# Patient Record
Sex: MALE | Race: WHITE | NOT HISPANIC OR LATINO | URBAN - METROPOLITAN AREA
[De-identification: names, ages, dates, MRNs, and addresses within clinical notes are randomized per-mention and may not be internally consistent; named-entity substitution may affect disease eponyms.]

---

## 2020-07-14 ENCOUNTER — INPATIENT (INPATIENT)
Facility: HOSPITAL | Age: 45
LOS: 1 days | Discharge: HOME | DRG: 603 | End: 2020-07-16
Attending: INTERNAL MEDICINE | Admitting: INTERNAL MEDICINE
Payer: MEDICAID

## 2020-07-14 VITALS
RESPIRATION RATE: 18 BRPM | OXYGEN SATURATION: 100 % | TEMPERATURE: 98 F | WEIGHT: 169.98 LBS | HEIGHT: 72 IN | HEART RATE: 104 BPM | DIASTOLIC BLOOD PRESSURE: 73 MMHG | SYSTOLIC BLOOD PRESSURE: 124 MMHG

## 2020-07-14 LAB
ALBUMIN SERPL ELPH-MCNC: 3.6 G/DL — SIGNIFICANT CHANGE UP (ref 3.3–5)
ALP SERPL-CCNC: 45 U/L — SIGNIFICANT CHANGE UP (ref 40–120)
ALT FLD-CCNC: 19 U/L — SIGNIFICANT CHANGE UP (ref 10–45)
ANION GAP SERPL CALC-SCNC: 12 MMOL/L — SIGNIFICANT CHANGE UP (ref 5–17)
AST SERPL-CCNC: 16 U/L — SIGNIFICANT CHANGE UP (ref 10–40)
BASOPHILS # BLD AUTO: 0.03 K/UL — SIGNIFICANT CHANGE UP (ref 0–0.2)
BASOPHILS NFR BLD AUTO: 0.4 % — SIGNIFICANT CHANGE UP (ref 0–2)
BILIRUB SERPL-MCNC: 0.6 MG/DL — SIGNIFICANT CHANGE UP (ref 0.2–1.2)
BUN SERPL-MCNC: 16 MG/DL — SIGNIFICANT CHANGE UP (ref 7–23)
CALCIUM SERPL-MCNC: 9 MG/DL — SIGNIFICANT CHANGE UP (ref 8.4–10.5)
CHLORIDE SERPL-SCNC: 102 MMOL/L — SIGNIFICANT CHANGE UP (ref 96–108)
CO2 SERPL-SCNC: 27 MMOL/L — SIGNIFICANT CHANGE UP (ref 22–31)
CREAT SERPL-MCNC: 1.1 MG/DL — SIGNIFICANT CHANGE UP (ref 0.5–1.3)
EOSINOPHIL # BLD AUTO: 0.17 K/UL — SIGNIFICANT CHANGE UP (ref 0–0.5)
EOSINOPHIL NFR BLD AUTO: 2 % — SIGNIFICANT CHANGE UP (ref 0–6)
GLUCOSE SERPL-MCNC: 73 MG/DL — SIGNIFICANT CHANGE UP (ref 70–99)
HCT VFR BLD CALC: 40.9 % — SIGNIFICANT CHANGE UP (ref 39–50)
HGB BLD-MCNC: 13.3 G/DL — SIGNIFICANT CHANGE UP (ref 13–17)
IMM GRANULOCYTES NFR BLD AUTO: 0.5 % — SIGNIFICANT CHANGE UP (ref 0–1.5)
LYMPHOCYTES # BLD AUTO: 1.28 K/UL — SIGNIFICANT CHANGE UP (ref 1–3.3)
LYMPHOCYTES # BLD AUTO: 15.1 % — SIGNIFICANT CHANGE UP (ref 13–44)
MCHC RBC-ENTMCNC: 29.6 PG — SIGNIFICANT CHANGE UP (ref 27–34)
MCHC RBC-ENTMCNC: 32.5 GM/DL — SIGNIFICANT CHANGE UP (ref 32–36)
MCV RBC AUTO: 91.1 FL — SIGNIFICANT CHANGE UP (ref 80–100)
MONOCYTES # BLD AUTO: 0.85 K/UL — SIGNIFICANT CHANGE UP (ref 0–0.9)
MONOCYTES NFR BLD AUTO: 10 % — SIGNIFICANT CHANGE UP (ref 2–14)
NEUTROPHILS # BLD AUTO: 6.11 K/UL — SIGNIFICANT CHANGE UP (ref 1.8–7.4)
NEUTROPHILS NFR BLD AUTO: 72 % — SIGNIFICANT CHANGE UP (ref 43–77)
NRBC # BLD: 0 /100 WBCS — SIGNIFICANT CHANGE UP (ref 0–0)
PLATELET # BLD AUTO: 296 K/UL — SIGNIFICANT CHANGE UP (ref 150–400)
POTASSIUM SERPL-MCNC: 4 MMOL/L — SIGNIFICANT CHANGE UP (ref 3.5–5.3)
POTASSIUM SERPL-SCNC: 4 MMOL/L — SIGNIFICANT CHANGE UP (ref 3.5–5.3)
PROT SERPL-MCNC: 8.2 G/DL — SIGNIFICANT CHANGE UP (ref 6–8.3)
RBC # BLD: 4.49 M/UL — SIGNIFICANT CHANGE UP (ref 4.2–5.8)
RBC # FLD: 12.8 % — SIGNIFICANT CHANGE UP (ref 10.3–14.5)
SARS-COV-2 RNA SPEC QL NAA+PROBE: SIGNIFICANT CHANGE UP
SODIUM SERPL-SCNC: 141 MMOL/L — SIGNIFICANT CHANGE UP (ref 135–145)
WBC # BLD: 8.48 K/UL — SIGNIFICANT CHANGE UP (ref 3.8–10.5)
WBC # FLD AUTO: 8.48 K/UL — SIGNIFICANT CHANGE UP (ref 3.8–10.5)

## 2020-07-14 PROCEDURE — 73701 CT LOWER EXTREMITY W/DYE: CPT | Mod: 26,RT

## 2020-07-14 PROCEDURE — 99223 1ST HOSP IP/OBS HIGH 75: CPT

## 2020-07-14 PROCEDURE — 99285 EMERGENCY DEPT VISIT HI MDM: CPT

## 2020-07-14 RX ORDER — SODIUM CHLORIDE 9 MG/ML
1000 INJECTION INTRAMUSCULAR; INTRAVENOUS; SUBCUTANEOUS
Refills: 0 | Status: DISCONTINUED | OUTPATIENT
Start: 2020-07-14 | End: 2020-07-15

## 2020-07-14 RX ORDER — ACETAMINOPHEN 500 MG
650 TABLET ORAL ONCE
Refills: 0 | Status: COMPLETED | OUTPATIENT
Start: 2020-07-14 | End: 2020-07-14

## 2020-07-14 RX ORDER — PIPERACILLIN AND TAZOBACTAM 4; .5 G/20ML; G/20ML
3.38 INJECTION, POWDER, LYOPHILIZED, FOR SOLUTION INTRAVENOUS ONCE
Refills: 0 | Status: COMPLETED | OUTPATIENT
Start: 2020-07-14 | End: 2020-07-14

## 2020-07-14 RX ORDER — VANCOMYCIN HCL 1 G
1250 VIAL (EA) INTRAVENOUS ONCE
Refills: 0 | Status: COMPLETED | OUTPATIENT
Start: 2020-07-14 | End: 2020-07-14

## 2020-07-14 RX ADMIN — Medication 250 MILLIGRAM(S): at 19:38

## 2020-07-14 RX ADMIN — Medication 650 MILLIGRAM(S): at 18:54

## 2020-07-14 RX ADMIN — SODIUM CHLORIDE 1000 MILLILITER(S): 9 INJECTION INTRAMUSCULAR; INTRAVENOUS; SUBCUTANEOUS at 18:55

## 2020-07-14 RX ADMIN — SODIUM CHLORIDE 1000 MILLILITER(S): 9 INJECTION INTRAMUSCULAR; INTRAVENOUS; SUBCUTANEOUS at 21:02

## 2020-07-14 RX ADMIN — Medication 1250 MILLIGRAM(S): at 21:02

## 2020-07-14 RX ADMIN — PIPERACILLIN AND TAZOBACTAM 200 GRAM(S): 4; .5 INJECTION, POWDER, LYOPHILIZED, FOR SOLUTION INTRAVENOUS at 18:55

## 2020-07-14 NOTE — H&P ADULT - PROBLEM SELECTOR PLAN 1
Pt presenting with worsening RLE swelling and redness s/p I&D and oral clindamycin. Found to have cellulitis of the RLE. No R knee involvement. Hx of MRSA skin infections in 2006 and 2009. S/p Vanc/zosyn in ED  - C/w Vanc/Zosyn  - Surgery consulted Pt presenting with worsening RLE swelling and redness s/p I&D and oral clindamycin. Not septic. Found to have cellulitis of the RLE. No R knee involvement. Hx of MRSA skin infections in 2006 and 2009. Low suspicion for nec fasc due to lack of severe pain and no crepitus. Low suspicion for compartment syndrome due to lack of sensation change and pain. S/p Vanc/zosyn in ED  - C/w Vanc/Zosyn, de-escalate if blood cultures are negative   - Surgery consulted, no surgical recommendation  - If knee becomes involved consult ortho Pt presenting with worsening RLE swelling and redness s/p I&D and oral clindamycin. Not septic. Found to have cellulitis of the RLE. No R knee involvement. Hx of MRSA skin infections in 2006 and 2009. Low suspicion for nec fasc due to lack of severe pain and no crepitus. Low suspicion for compartment syndrome due to lack of sensation change and pain. S/p Vanc/zosyn in ED  - C/w Vanc/Zosyn due to exposures (Beach and hot tub) increases chances for pseudomonas, de-escalate if blood cultures are negative   - Surgery consulted, no surgical recommendation  - If knee becomes involved consult ortho

## 2020-07-14 NOTE — H&P ADULT - PROBLEM SELECTOR PLAN 2
Pt has not been on anti-retro viral medications since 2017 after having an adverse reaction to truvada. Is open to speaking with HIV team and establishing new follow-up.  - Consult HIV team  - F/u CD4 and viral load labs

## 2020-07-14 NOTE — ED PROVIDER NOTE - PROGRESS NOTE DETAILS
Klepfish: labs grossly wnl. Pt remains w/o systemic symptoms and exam unchanged. clinically not nec fasc. evaluated by surgery, awaiting dispo. updated pt.

## 2020-07-14 NOTE — ED PROVIDER NOTE - CADM POA PRESS ULCER
After consent obtained/verified, allergy injection given in back of R/L arm(s). Documentation of vial injection specific to arm(s) noted on Allergy Immunotherapy Administration Form. Patient waited 30 minutes for observation. Patient tolerated well without adverse reaction. SHOT REACTION TREATMENT INSTRUCTIONS    During the 30 minute wait after an allergy injection the following symptoms should be reported:    Itching other than at the injection site  Hives or swelling other than at the injection site  Redness other than at the injection site  Difficulty breathing  Chest tightness  Difficulty swallowing  Throat tightness    If these symptoms occur, NOTIFY PROVIDER and the following treatment should be administered:    1. Epinephrine 1:1000 IM - 0.3 ml if > 66 lbs or more, 0.15 ml if 33 - 63 lbs, or 0.1 ml if <33 lbs   2. Diphenhydramine - give all intramuscular:     2 to <6 years (off-label use): 6.25 mg,    6 to <12 years: 12.5 to 25 mg;    ?12 years: 25-50 mg.  3.  Famotidine:  Adults 40 mg oral    Adolescents age 12 years and >88 lbs: 40 mg    Children and Adolescents ? 12years of age: Initial: 0.25 mg/kg/dose   every 12 hours (maximum daily dose: 40 mg/day)    Epi dose may me repeated in 5-15 minutes if adequate resolution of symptoms does not occur    Patient should be observed for at least one hour after final epi dose and must be seen by provider. Patients cannot drive themselves if they have received diphenhydramine. No

## 2020-07-14 NOTE — CONSULT NOTE ADULT - SUBJECTIVE AND OBJECTIVE BOX
CONSULT NOTE    HPI:  44 y/o Male pmh [facility] with unknown viral load and CD4 count presents with 3 days of RLE pain and swelling. Patient states 3 days ago he noted swelling and redness around the ankle. Swelling associated with pain. He was seen at an urgent care where an I&D of the lession was performed and was discharge on Clindamycin He now returns with increased swelling and drainage from the area, as well as progression up the leg. States has had similar infections in the past, most recent was an MRSA infection in 2009 of the Left Lower extremity. Denies numbness, tingling, chills, or additional acute complaints.         PAST MEDICAL & SURGICAL HISTORY:  HIV (human immunodeficiency virus infection)        PAST SURGICAL HISTORY:   No significant Past Surgical History     REVIEW OF SYSTEMS:   Pertinent positives/negatives noted in HPI.     HOME MEDICATIONS:  Valacyclovir    ALLERGIES:  Allergies    No Known Allergies    Intolerances        SOCIAL HISTORY:    FAMILY HISTORY:      Vital Signs Last 24 Hrs  T(C): 37.1 (14 Jul 2020 22:36), Max: 37.1 (14 Jul 2020 22:36)  T(F): 98.7 (14 Jul 2020 22:36), Max: 98.7 (14 Jul 2020 22:36)  HR: 90 (14 Jul 2020 22:36) (90 - 104)  BP: 110/69 (14 Jul 2020 22:36) (110/69 - 124/73)  BP(mean): --  RR: 16 (14 Jul 2020 22:36) (16 - 18)  SpO2: 100% (14 Jul 2020 22:36) (100% - 100%)    Physical Exam  General: NAD, resting comfortably in bed  C/V: NSR  Pulm: Nonlabored breathing, no respiratory distress  Abd: soft, non-tender, non-distended.  Extrem: WWP, RLE indurated and erythematous from the ankle to the knee, area of drainage at the lateral leg.   Neuro: A/O x 3, CNs II-XII grossly intact, no focal deficits, normal sensation of bilateral LE  Pulses: palpable distal pulses     LABS:                        13.3   8.48  )-----------( 296      ( 14 Jul 2020 18:09 )             40.9     07-14    141  |  102  |  16  ----------------------------<  73  4.0   |  27  |  1.10    Ca    9.0      14 Jul 2020 18:09    TPro  8.2  /  Alb  3.6  /  TBili  0.6  /  DBili  x   /  AST  16  /  ALT  19  /  AlkPhos  45  07-14          RADIOLOGY AND ADDITIONAL STUDIES  < from: CT Lower Extremity w/ IV Cont, Right (07.14.20 @ 18:40) >  FINDINGS:  Bones/joints: Normal. No acute fracture or dislocation.  Soft tissues: Edematous change subcutaneous soft tissues at the calf and top of the foot consistent  with history of cellulitis. No definite involvement of the deep soft tissues. No focal fluid collections to  suggest abscess formation.    IMPRESSION:  Edematous change subcutaneous soft tissues at the calf and top of the foot consistent with history of  cellulitis. No definite involvement of the deep soft tissues.  Thank you for allowing us to participate in the care of your patient.  Dictated and Authenticated by: Logan Bradford MD  07/14/2020 8:13 PM Eastern Time ([place] & [place])      The above report was submitted by the [facility] attending radiologist and copied to PowerScribe by resident Dr. Scales.             Thank you for the opportunity to participate in the care of this patient.  ******PRELIMINARY REPORT******    ******PRELIMINARY REPORT******          ION SCALES M.D., RADIOLOGY RESIDENT        < end of copied text >

## 2020-07-14 NOTE — ED PROVIDER NOTE - PHYSICAL EXAMINATION
Klepfish: normal dorsi/plantar flexion, sensation intact, strength 5/5, normal equal distal pulses. +wound to RLE, lateral aspect, superior to malleolus, +minimal localized firmness at that site. 2+ edema from toes to distal thigh, +blanching erythema/warmth as well. No crepitus, induration, fluctuance. FROM all extremities. Klepfish: normal dorsi/plantar flexion, sensation intact, strength 5/5, normal equal distal pulses. +wound to RLE, lateral aspect, superior to malleolus, +minimal localized firmness at that site. 2+ edema from toes to distal thigh, +blanching erythema/warmth as well. No crepitus, induration, fluctuance. FROM all extremities.    VITAL SIGNS: I have reviewed nursing notes and confirm.  CONSTITUTIONAL: Well-developed; well-nourished; in no acute distress.   SKIN:  warm and dry, no acute rash.   HEAD:  normocephalic, atraumatic.  EYES: PERRL, EOM intact; conjunctiva and sclera clear.  ENT: No nasal discharge; airway clear.   NECK: Supple; non tender.  CARD: S1, S2 normal; no murmurs, gallops, or rubs. Regular rate and rhythm.   RESP:  Clear to auscultation b/l, no wheezes, rales or rhonchi.  ABD: Normal bowel sounds; soft; non-distended; non-tender; no guarding/ rebound.  NEURO: Alert, oriented, grossly unremarkable  PSYCH: Cooperative, mood and affect appropriate.

## 2020-07-14 NOTE — H&P ADULT - NSHPPHYSICALEXAM_GEN_ALL_CORE
.  VITAL SIGNS:  T(C): 37.1 (07-14-20 @ 22:36), Max: 37.1 (07-14-20 @ 22:36)  T(F): 98.7 (07-14-20 @ 22:36), Max: 98.7 (07-14-20 @ 22:36)  HR: 90 (07-14-20 @ 22:36) (90 - 104)  BP: 110/69 (07-14-20 @ 22:36) (110/69 - 124/73)  BP(mean): --  RR: 16 (07-14-20 @ 22:36) (16 - 18)  SpO2: 100% (07-14-20 @ 22:36) (100% - 100%)  Wt(kg): --    PHYSICAL EXAM:    Constitutional: WDWN resting comfortably in chair; NAD  Eyes: PERRL, anicteric sclera  ENT: Couple of herpes lesions on lips   Neck: supple; no JVD  Respiratory: CTA B/L; no W/R/R, no retractions  Cardiac: +S1/S2; RRR; no M/R/G; PMI non-displaced  Gastrointestinal: abdomen soft, NT/ND; no rebound or guarding; +BSx4  Extremities: WWP. 2+ edema in the R foot, R LE is swollen and tense from the ankle to just below the knee. Erythematous and warm from below knee to and including the foot.   Musculoskeletal: NROM x4; no joint swelling, tenderness or erythema  Vascular: 2+ radial, DP/PT pulses B/L  Neurologic: AAOx3 .  VITAL SIGNS:  T(C): 37.1 (07-14-20 @ 22:36), Max: 37.1 (07-14-20 @ 22:36)  T(F): 98.7 (07-14-20 @ 22:36), Max: 98.7 (07-14-20 @ 22:36)  HR: 90 (07-14-20 @ 22:36) (90 - 104)  BP: 110/69 (07-14-20 @ 22:36) (110/69 - 124/73)  BP(mean): --  RR: 16 (07-14-20 @ 22:36) (16 - 18)  SpO2: 100% (07-14-20 @ 22:36) (100% - 100%)  Wt(kg): --    PHYSICAL EXAM:    Constitutional: WDWN resting comfortably in chair; NAD  Eyes: PERRL, anicteric sclera  ENT: Couple of herpes lesions on lips   Neck: supple; no JVD  Respiratory: CTA B/L; no W/R/R, no retractions  Cardiac: +S1/S2; RRR; no M/R/G; PMI non-displaced  Gastrointestinal: abdomen soft, NT/ND; no rebound or guarding; +BSx4  Extremities: WWP. 2+ edema in the R foot, R LE is swollen and tense from the ankle to just below the knee. Erythematous and warm from below knee to and including the foot. No crepitus, no TTP.  Musculoskeletal: NROM x4; no joint swelling, tenderness or erythema  Vascular: 2+ radial, DP/PT pulses B/L  Neurologic: AAOx3

## 2020-07-14 NOTE — ED PROVIDER NOTE - NS ED ROS FT
Constitutional: No fever. No chills.  Eyes: No redness. No discharge. No vision change.   ENT: No sore throat. No ear pain.  Cardiovascular: No chest pain. No leg swelling.  Respiratory: No cough. No shortness of breath.  GI: No abdominal pain. No vomiting. No diarrhea.   MSK: Right lower leg redness and swelling. No back pain.   Skin: No rash. No abrasions.   Neuro: No numbness. No weakness.   Psych: No known mental health issues.

## 2020-07-14 NOTE — H&P ADULT - ATTENDING COMMENTS
44 y/o MSM Male PMHx HIV (Unknown VL, CD4 - not on medication) p/w progressive RLE pain and swelling after recent I&D of ankle abscess – admitted for worsening cellulitis.     1. Severe Cellulitis, Purulent   - Not meeting SIRS at this time   - Patient with focal ankle abscess, s/p I&D, with expression of significant pus. Now, area with minimal drainage.   - Potential for seeding of bacteria given recent instrumentation leading to progression of cellulitis   - Patient w/ h/o MRSA infections and abscess formation   - Also with beach/hot tub exposures - increasing risk for pseudomonas or polymicrobial infection   - At present, c/w Vanc/Zosyn   - Obtain [facility] MD Culture Data   - F/u Blood Cultures   - Surgery consulted - CT reveals a superficial infection, no gas - no surgical intervention required.     2. HIV, untreated   - HIV Consult   - F/u CD4/VL  - Lack of treatment and f/u due to recent lapse in insurance   - Prior h/o reaction to Truvada

## 2020-07-14 NOTE — CONSULT NOTE ADULT - ASSESSMENT
46 y/o Male pmh HIV with unknown viral load and CD4 count presents with 3 days of RLE pain and swelling 2/2 Cellulitis. Surgery consulted for concern for necrotizing fasciitis. Given CT and physical exam findings, less likely Necrotizing Fasciitis.    -No acute surgical intervention at this time  -cont w/ IV antibiotics  -Recommend admitting to medicine  -Recommend LE Dupplex to r/o DVT  -Team 4c will continue to follow  -Plan discussed with attending and chief resident

## 2020-07-14 NOTE — H&P ADULT - HISTORY OF PRESENT ILLNESS
45M w/ pmhx of HIV (not currently on anti-retroviral therapy and unk last CD4/viral load count) and previous MRSA skin infections in 2006 and 2009, presenting with increased swelling, erythema and subjective fevers that have progressively worsened since 7/12. Starting 7/8 the patient noticed a circular raised red rash on the posterior R leg. Between 7/8 and 7/12 this grew in size after attempting to bring it down with warm compresses. The patient endorsed hot tub and public pool usage in [place] on 7/9. On 7/12 the patient went to an urgent care where a I&D was performed and was sent home with clindamycin. The redness and swelling since th I&D spread down to and including the foot as well as up to the knee, but not including the knee. The patient endorses minimal pain and discomfort, as well as subjective fevers. Pt denies any joint pain, weakness, change in sensation, HA, or lightheadedness.     ED Vitals: Afebrile, -->90, /73-->110/69, RR 18, O2% 100 RA  Labs s/f: None  ED Course: Vanc/zosyn, tylenol, 1L NS. Surg consulted with no recommendations   Imaging: CT LE showing cellulitis with no abscess, gas or involvement in deeper tissues 45M w/ pmhx of HIV (not currently on anti-retroviral therapy and unk last CD4/viral load count) and previous MRSA skin infections in 2006 and 2009, presenting with increased swelling, erythema and subjective fevers that have progressively worsened since 7/12. Starting 7/8 the patient noticed a circular raised red rash on the posterior R leg. Between 7/8 and 7/12 this grew in size after attempting to bring it down with warm compresses. The patient endorsed hot tub and public pool usage in [place] on 7/9 as well as beach during the week prior to noticing the initial lesion. On 7/12 the patient went to an urgent care where a I&D was performed and was sent home with clindamycin. The redness and swelling since th I&D spread down to and including the foot as well as up to the knee, but not including the knee. The patient endorses minimal pain and discomfort, as well as subjective fevers. Pt denies any joint pain, weakness, change in sensation, HA, or lightheadedness.     ED Vitals: Afebrile, -->90, /73-->110/69, RR 18, O2% 100 RA  Labs s/f: None  ED Course: Vanc/zosyn, tylenol, 1L NS. Surg consulted with no recommendations   Imaging: CT LE showing cellulitis with no abscess, gas or involvement in deeper tissues

## 2020-07-14 NOTE — ED PROVIDER NOTE - OBJECTIVE STATEMENT
46 y/o M, PMHx of HIV not on antiretroviral viral load, unknown CD4, presents to the ED complaining of 3 days of progressive worsening right lower leg redness and swelling. Was seen 3 days ago at a urgent care for abscess on right ankle. Had I&D at that time and place. On clindamycin which he is taking. States over 24 hours, noted significant increase in lower leg swelling and redness creeping up the leg and pain at the site. Also reports a fever with max temp of 100. Today was reevaluated at urgent care and sent to ED for IV antibiotics. 44 y/o M, PMHx of HIV not on antiretroviral viral load, unknown CD4, presents to the ED complaining of 3 days of progressive worsening right lower leg redness and swelling. Was seen 3 days ago at a urgent care for abscess on right ankle. Had I&D at that time and place. On clindamycin which he is taking. States over 24 hours, noted significant increase in lower leg swelling and redness creeping up the leg and pain at the site. Also reports a fever with max temp of 100F. Today was reevaluated at urgent care and sent to ED for IV antibiotics.

## 2020-07-14 NOTE — H&P ADULT - NSHPSOCIALHISTORY_GEN_ALL_CORE
Social EtOH use. Occasional cocaine, marijuana, crystal usage. 1 pack year history. No new recent sexual partners

## 2020-07-14 NOTE — ED PROVIDER NOTE - CLINICAL SUMMARY MEDICAL DECISION MAKING FREE TEXT BOX
ED course unremarkable - afebrile and hemodynamically stable. Pt has extensive right lower leg cellulitis. WBC wnl. No acute metabolic abnormalty. Lactate pending. Blood cultures x2 pending. Started on vancomycin IV and zosyn IV while in ED. CT right lower extremity obtained, notable for extensive cellulitis without gas or deep space abscess. Surgery consulted for evaluation. Pending admission.

## 2020-07-14 NOTE — CONSULT NOTE ADULT - ATTENDING COMMENTS
Patient seen and examined at bedside on 7/15.  RLE with erythema and edema.    CT consistent with cellulitis.    Continue IV antibiotics  Leg elevation  LE duplex to rule out DVT

## 2020-07-14 NOTE — H&P ADULT - NSHPLABSRESULTS_GEN_ALL_CORE
.  LABS:                         13.3   8.48  )-----------( 296      ( 14 Jul 2020 18:09 )             40.9     07-14    141  |  102  |  16  ----------------------------<  73  4.0   |  27  |  1.10    Ca    9.0      14 Jul 2020 18:09    TPro  8.2  /  Alb  3.6  /  TBili  0.6  /  DBili  x   /  AST  16  /  ALT  19  /  AlkPhos  45  07-14              RADIOLOGY, EKG & ADDITIONAL TESTS: Reviewed.

## 2020-07-14 NOTE — H&P ADULT - ASSESSMENT
45M w/ hx of HIV (not on medication, unk last viral/CD4 count) and previous MRSA skin infections p/w worsening swelling, redness in the R LE and subjective fevers s/p I&D on 7/12 and oral clindamycin found to have cellulitis

## 2020-07-14 NOTE — H&P ADULT - PROBLEM SELECTOR PLAN 3
F: tolerating PO, no IVF  E: replete K<4, Mg<2  N: Regular     VTE Prophylaxis: None   C: Full Code  D: RMF

## 2020-07-14 NOTE — ED PROVIDER NOTE - ATTENDING CONTRIBUTION TO CARE
45M PMh HIV (not on HAART for yrs, unknown last CDV/VL) p/w RLE pain. A few days ago pt developed RLE pain/swelling, went to  2d ago where I and D of RLE perofrmed and started on clinda, pt adherent. Since yesterday, pain has worsened and RLE w/ dramatic increase in redness and swelling, also subjective fevers yesterday. Hx of prior MRSA abscess. Denies HA, SOB/CP, lightheaded, NVD, abd pain, focal weakness/numbness. Mild tachycardia, other vitals wnl, exam as above.  ddx: Extensive cellultis, ?deeper infection/abscess, low suspicion for nec fasc. Clinically not compartment syndrome.  Given extensive swelling and rapid onset, pt sent for stat CT to eval deeper infection (d/w pt risks/benefits of CT w/ contrast prior to cr results).   I d/w radiologist - has extensive inflammation but no obvious abscess/foci of gas. Given extent of infection, surgery consulted as pt may benefit from being on surgical service or possible future debridement should infection worsen.  blood cx, CK, CBC, cmp, empiric abx. Reassess.   Pt aware of plan.

## 2020-07-15 ENCOUNTER — TRANSCRIPTION ENCOUNTER (OUTPATIENT)
Age: 45
End: 2020-07-15

## 2020-07-15 DIAGNOSIS — B20 HUMAN IMMUNODEFICIENCY VIRUS [HIV] DISEASE: ICD-10-CM

## 2020-07-15 DIAGNOSIS — Z29.9 ENCOUNTER FOR PROPHYLACTIC MEASURES, UNSPECIFIED: ICD-10-CM

## 2020-07-15 DIAGNOSIS — L03.90 CELLULITIS, UNSPECIFIED: ICD-10-CM

## 2020-07-15 LAB
4/8 RATIO: 0.13 RATIO — LOW (ref 0.9–3.6)
ABS CD8: 787 /UL — HIGH (ref 142–740)
ALBUMIN SERPL ELPH-MCNC: 3.4 G/DL — SIGNIFICANT CHANGE UP (ref 3.3–5)
ALP SERPL-CCNC: 54 U/L — SIGNIFICANT CHANGE UP (ref 40–120)
ALT FLD-CCNC: 17 U/L — SIGNIFICANT CHANGE UP (ref 10–45)
ANION GAP SERPL CALC-SCNC: 10 MMOL/L — SIGNIFICANT CHANGE UP (ref 5–17)
AST SERPL-CCNC: 15 U/L — SIGNIFICANT CHANGE UP (ref 10–40)
BASOPHILS # BLD AUTO: 0.05 K/UL — SIGNIFICANT CHANGE UP (ref 0–0.2)
BASOPHILS NFR BLD AUTO: 0.6 % — SIGNIFICANT CHANGE UP (ref 0–2)
BILIRUB SERPL-MCNC: 0.3 MG/DL — SIGNIFICANT CHANGE UP (ref 0.2–1.2)
BUN SERPL-MCNC: 16 MG/DL — SIGNIFICANT CHANGE UP (ref 7–23)
CALCIUM SERPL-MCNC: 8.3 MG/DL — LOW (ref 8.4–10.5)
CD3 BLASTS SPEC-ACNC: 82 % — SIGNIFICANT CHANGE UP (ref 59–83)
CD3 BLASTS SPEC-ACNC: 926 /UL — SIGNIFICANT CHANGE UP (ref 672–1870)
CD4 %: 9 % — LOW (ref 30–62)
CD8 %: 70 % — HIGH (ref 12–36)
CHLORIDE SERPL-SCNC: 103 MMOL/L — SIGNIFICANT CHANGE UP (ref 96–108)
CO2 SERPL-SCNC: 23 MMOL/L — SIGNIFICANT CHANGE UP (ref 22–31)
CREAT SERPL-MCNC: 0.97 MG/DL — SIGNIFICANT CHANGE UP (ref 0.5–1.3)
EOSINOPHIL # BLD AUTO: 0.29 K/UL — SIGNIFICANT CHANGE UP (ref 0–0.5)
EOSINOPHIL NFR BLD AUTO: 3.2 % — SIGNIFICANT CHANGE UP (ref 0–6)
GLUCOSE SERPL-MCNC: 103 MG/DL — HIGH (ref 70–99)
HCT VFR BLD CALC: 38.8 % — LOW (ref 39–50)
HGB BLD-MCNC: 12.7 G/DL — LOW (ref 13–17)
HIV 1+2 AB+HIV1 P24 AG SERPL QL IA: REACTIVE
IMM GRANULOCYTES NFR BLD AUTO: 0.4 % — SIGNIFICANT CHANGE UP (ref 0–1.5)
LYMPHOCYTES # BLD AUTO: 1.1 K/UL — SIGNIFICANT CHANGE UP (ref 1–3.3)
LYMPHOCYTES # BLD AUTO: 12.2 % — LOW (ref 13–44)
MAGNESIUM SERPL-MCNC: 2.1 MG/DL — SIGNIFICANT CHANGE UP (ref 1.6–2.6)
MCHC RBC-ENTMCNC: 29.9 PG — SIGNIFICANT CHANGE UP (ref 27–34)
MCHC RBC-ENTMCNC: 32.7 GM/DL — SIGNIFICANT CHANGE UP (ref 32–36)
MCV RBC AUTO: 91.3 FL — SIGNIFICANT CHANGE UP (ref 80–100)
MONOCYTES # BLD AUTO: 0.63 K/UL — SIGNIFICANT CHANGE UP (ref 0–0.9)
MONOCYTES NFR BLD AUTO: 7 % — SIGNIFICANT CHANGE UP (ref 2–14)
NEUTROPHILS # BLD AUTO: 6.87 K/UL — SIGNIFICANT CHANGE UP (ref 1.8–7.4)
NEUTROPHILS NFR BLD AUTO: 76.6 % — SIGNIFICANT CHANGE UP (ref 43–77)
NRBC # BLD: 0 /100 WBCS — SIGNIFICANT CHANGE UP (ref 0–0)
PLATELET # BLD AUTO: 288 K/UL — SIGNIFICANT CHANGE UP (ref 150–400)
POTASSIUM SERPL-MCNC: 4.3 MMOL/L — SIGNIFICANT CHANGE UP (ref 3.5–5.3)
POTASSIUM SERPL-SCNC: 4.3 MMOL/L — SIGNIFICANT CHANGE UP (ref 3.5–5.3)
PROT SERPL-MCNC: 7.6 G/DL — SIGNIFICANT CHANGE UP (ref 6–8.3)
RBC # BLD: 4.25 M/UL — SIGNIFICANT CHANGE UP (ref 4.2–5.8)
RBC # FLD: 12.9 % — SIGNIFICANT CHANGE UP (ref 10.3–14.5)
SODIUM SERPL-SCNC: 136 MMOL/L — SIGNIFICANT CHANGE UP (ref 135–145)
T-CELL CD4 SUBSET PNL BLD: 103 /UL — LOW (ref 489–1457)
WBC # BLD: 8.98 K/UL — SIGNIFICANT CHANGE UP (ref 3.8–10.5)
WBC # FLD AUTO: 8.98 K/UL — SIGNIFICANT CHANGE UP (ref 3.8–10.5)

## 2020-07-15 PROCEDURE — 99233 SBSQ HOSP IP/OBS HIGH 50: CPT | Mod: GC

## 2020-07-15 PROCEDURE — 99253 IP/OBS CNSLTJ NEW/EST LOW 45: CPT | Mod: GC

## 2020-07-15 PROCEDURE — 93971 EXTREMITY STUDY: CPT | Mod: 26,RT

## 2020-07-15 PROCEDURE — 99232 SBSQ HOSP IP/OBS MODERATE 35: CPT | Mod: GC

## 2020-07-15 RX ORDER — ATOVAQUONE 750 MG/5ML
1500 SUSPENSION ORAL DAILY
Refills: 0 | Status: DISCONTINUED | OUTPATIENT
Start: 2020-07-15 | End: 2020-07-16

## 2020-07-15 RX ORDER — CALCIUM CARBONATE 500(1250)
1 TABLET ORAL ONCE
Refills: 0 | Status: COMPLETED | OUTPATIENT
Start: 2020-07-15 | End: 2020-07-15

## 2020-07-15 RX ORDER — PIPERACILLIN AND TAZOBACTAM 4; .5 G/20ML; G/20ML
3.38 INJECTION, POWDER, LYOPHILIZED, FOR SOLUTION INTRAVENOUS EVERY 8 HOURS
Refills: 0 | Status: DISCONTINUED | OUTPATIENT
Start: 2020-07-15 | End: 2020-07-15

## 2020-07-15 RX ORDER — CETIRIZINE HYDROCHLORIDE 10 MG/1
1 TABLET ORAL
Qty: 0 | Refills: 0 | DISCHARGE

## 2020-07-15 RX ORDER — BICTEGRAVIR SODIUM, EMTRICITABINE, AND TENOFOVIR ALAFENAMIDE FUMARATE 30; 120; 15 MG/1; MG/1; MG/1
1 TABLET ORAL DAILY
Refills: 0 | Status: DISCONTINUED | OUTPATIENT
Start: 2020-07-15 | End: 2020-07-15

## 2020-07-15 RX ORDER — DOLUTEGRAVIR SODIUM AND LAMIVUDINE 50; 300 MG/1; MG/1
1 TABLET, FILM COATED ORAL
Qty: 30 | Refills: 0
Start: 2020-07-15 | End: 2020-08-13

## 2020-07-15 RX ORDER — ACYCLOVIR SODIUM 500 MG
0 VIAL (EA) INTRAVENOUS
Qty: 0 | Refills: 0 | DISCHARGE

## 2020-07-15 RX ORDER — TENOFOVIR DISOPROXIL FUMARATE 300 MG/1
25 TABLET, FILM COATED ORAL ONCE
Refills: 0 | Status: COMPLETED | OUTPATIENT
Start: 2020-07-15 | End: 2020-07-15

## 2020-07-15 RX ORDER — LAMIVUDINE 150 MG
300 TABLET ORAL DAILY
Refills: 0 | Status: DISCONTINUED | OUTPATIENT
Start: 2020-07-15 | End: 2020-07-15

## 2020-07-15 RX ORDER — ACETAMINOPHEN 500 MG
650 TABLET ORAL ONCE
Refills: 0 | Status: COMPLETED | OUTPATIENT
Start: 2020-07-15 | End: 2020-07-15

## 2020-07-15 RX ORDER — VANCOMYCIN HCL 1 G
1250 VIAL (EA) INTRAVENOUS EVERY 12 HOURS
Refills: 0 | Status: COMPLETED | OUTPATIENT
Start: 2020-07-15 | End: 2020-07-15

## 2020-07-15 RX ORDER — TENOFOVIR DISOPROXIL FUMARATE 300 MG/1
25 TABLET, FILM COATED ORAL DAILY
Refills: 0 | Status: DISCONTINUED | OUTPATIENT
Start: 2020-07-15 | End: 2020-07-15

## 2020-07-15 RX ORDER — DOLUTEGRAVIR SODIUM 25 MG/1
50 TABLET, FILM COATED ORAL DAILY
Refills: 0 | Status: DISCONTINUED | OUTPATIENT
Start: 2020-07-15 | End: 2020-07-15

## 2020-07-15 RX ADMIN — Medication 166.67 MILLIGRAM(S): at 19:04

## 2020-07-15 RX ADMIN — TENOFOVIR DISOPROXIL FUMARATE 25 MILLIGRAM(S): 300 TABLET, FILM COATED ORAL at 22:57

## 2020-07-15 RX ADMIN — DOLUTEGRAVIR SODIUM 50 MILLIGRAM(S): 25 TABLET, FILM COATED ORAL at 14:51

## 2020-07-15 RX ADMIN — Medication 166.67 MILLIGRAM(S): at 06:31

## 2020-07-15 RX ADMIN — Medication 650 MILLIGRAM(S): at 16:15

## 2020-07-15 RX ADMIN — Medication 650 MILLIGRAM(S): at 15:15

## 2020-07-15 RX ADMIN — ATOVAQUONE 1500 MILLIGRAM(S): 750 SUSPENSION ORAL at 16:18

## 2020-07-15 RX ADMIN — Medication 300 MILLIGRAM(S): at 14:51

## 2020-07-15 RX ADMIN — PIPERACILLIN AND TAZOBACTAM 200 GRAM(S): 4; .5 INJECTION, POWDER, LYOPHILIZED, FOR SOLUTION INTRAVENOUS at 02:02

## 2020-07-15 NOTE — DISCHARGE NOTE PROVIDER - NSDCCPCAREPLAN_GEN_ALL_CORE_FT
PRINCIPAL DISCHARGE DIAGNOSIS  Diagnosis: Cellulitis  Assessment and Plan of Treatment: Cellulitis is infection of the skin. This infection can cause redness, pain, and swelling. Cellulitis tends to affect deep layers of skin and sometimes the fat under the skin.  Some people with cellulitis  can sometimes also have fever or chills.These infections are treated with antibiotic pills. It is important to follow the directions exactly. Take all of the pills you are given, even if you feel better before you finish them. If you do not take all the pills, the infection can come back worse. In the hospital you were given antibiotics through an IV for the infection and some fluids. On discharge you will have a prescription for antibiotics to take at home. Please complete the course of antibiotics. If you experience worsening symptoms such as a spreading of the redness and swelling, increased pain, or fever and chills please return to the hospital.      SECONDARY DISCHARGE DIAGNOSES  Diagnosis: HIV (human immunodeficiency virus infection)  Assessment and Plan of Treatment: You have history of HIV. You don't take any medication at home for this. You have tried trubvada in the past, but experienced an allergic reaction. In the hospital we tested your CD4 count and viral load and are awaiting results from this. We also called the HIV specialists in the hospital to see you and give you some recomendations about what medications to take. They will also see you outpatient as your new primary doctor in about a week, the appointment is already made. Please follow up with as scheduled. PRINCIPAL DISCHARGE DIAGNOSIS  Diagnosis: Cellulitis  Assessment and Plan of Treatment: Cellulitis is infection of the skin. This infection can cause redness, pain, and swelling. Cellulitis tends to affect deep layers of skin and sometimes the fat under the skin.  Some people with cellulitis  can sometimes also have fever or chills.These infections are treated with antibiotic pills. It is important to follow the directions exactly. Take all of the pills you are given, even if you feel better before you finish them. If you do not take all the pills, the infection can come back worse. In the hospital you were given antibiotics through an IV for the infection and some fluids. On discharge you will have a prescription for antibiotics to take at home. Please complete the course of antibiotics. If you experience worsening symptoms such as a spreading of the redness and swelling, increased pain, or fever and chills please return to the hospital.      SECONDARY DISCHARGE DIAGNOSES  Diagnosis: HIV (human immunodeficiency virus infection)  Assessment and Plan of Treatment: You have history of HIV. You don't take any medication at home for this. You have tried trubvada in the past, but experienced an allergic reaction. In the hospital we tested your CD4 count and viral load and are awaiting results from this. We also called the HIV specialists in the hospital to see you and they reccomended Dovato. They will also see you outpatient as your new primary doctor, if you would like, in about a week. The appointment is already made. Please follow up with as scheduled. PRINCIPAL DISCHARGE DIAGNOSIS  Diagnosis: Cellulitis  Assessment and Plan of Treatment: Cellulitis is infection of the skin. This infection can cause redness, pain, and swelling. Cellulitis tends to affect deep layers of skin and sometimes the fat under the skin.  Some people with cellulitis  can sometimes also have fever or chills.These infections are treated with antibiotic pills. It is important to follow the directions exactly. Take all of the pills you are given, even if you feel better before you finish them. If you do not take all the pills, the infection can come back worse. In the hospital you were given antibiotics through an IV for the infection and some fluids. On discharge you will have a prescription for antibiotics to take at home. Please complete the course of antibiotics. If you experience worsening symptoms such as a spreading of the redness and swelling, increased pain, or fever and chills please return to the hospital.      SECONDARY DISCHARGE DIAGNOSES  Diagnosis: HIV (human immunodeficiency virus infection)  Assessment and Plan of Treatment: You have history of HIV. You don't take any medication at home for this. You have tried trubvada in the past, but experienced an allergic reaction. In the hospital we tested your CD4 count and viral load and are awaiting results from this. We also called the HIV specialists in the hospital to see you and they reccomended Biktarvy. They will also see you outpatient as your new primary doctor, if you would like, in about a week. The appointment is already made. Please follow up with as scheduled.

## 2020-07-15 NOTE — DISCHARGE NOTE PROVIDER - NSDCMRMEDTOKEN_GEN_ALL_CORE_FT
ZyrTEC 5 mg oral tablet: 1 tab(s) orally once a day, As Needed Adoxa 100 mg oral tablet: 1 tab(s) orally 2 times a day   dolutegravir-lamivudine 50 mg-300 mg oral tablet: 1 tab(s) orally once a day   ZyrTEC 5 mg oral tablet: 1 tab(s) orally once a day, As Needed Adoxa 100 mg oral tablet: 1 tab(s) orally 2 times a day for 10 days total starting 7/17/2020.  atovaquone 750 mg/5 mL oral suspension: 10 milliliter(s) orally once a day  bictegravir/emtricitabine/tenofovir 50 mg-200 mg-25 mg oral tablet: 1 tab(s) orally once a day   ZyrTEC 5 mg oral tablet: 1 tab(s) orally once a day, As Needed

## 2020-07-15 NOTE — CHART NOTE - NSCHARTNOTEFT_GEN_A_CORE
HIV CONSULT NOTE    CHIEF COMPLAINT:  Patient is a 45y old  Male who presents with a chief complaint of Cellulitis (15 Jul 2020 10:16)    HPI:  45M w/ pmhx of HIV (not currently on anti-retroviral therapy and unk last CD4/viral load count) and previous MRSA skin infections in 2006 and 2009, presenting with increased swelling, erythema and subjective fevers that have progressively worsened since 7/12. Starting 7/8 the patient noticed a circular raised red rash on the posterior R leg. Between 7/8 and 7/12 this grew in size after attempting to bring it down with warm compresses. The patient endorsed hot tub and public pool usage in [place] on 7/9 as well as beach during the week prior to noticing the initial lesion. On 7/12 the patient went to an urgent care where a I&D was performed and was sent home with clindamycin. The redness and swelling since th I&D spread down to and including the foot as well as up to the knee, but not including the knee. The patient endorses minimal pain and discomfort, as well as subjective fevers. Pt denies any joint pain, weakness, change in sensation, HA, or lightheadedness.     Outpatient HIV Provider: None  Year of HIV Diagnosis: 2005  T cell fang: unknown  Highest Viral Load: unknown  Current ARV regimen: not on ARV currently  ARV adherence: Was on for two years (7752-3011) before discontinuing due to insurance gaps   Previous ARV regimens: Truvada (reports previous reaction), -anavir (patient does not remember name) + additional agent  Hx of Past Oppurtunistic Infections: None     PAST MEDICAL & SURGICAL HISTORY:  HIV (human immunodeficiency virus infection)  No significant past surgical history      Social Hx:  Works as a   Lives in [email or web address] (has lived in same [facility] for 10 yrs)  No alcohol use, no smoking, reports casual drug use (cocaine, does not purchase, uses when available at friends home)     Sexual History:  MSM, receptive partner, same partner for at least last 6 months, partner is HIV negative and on PREP, does not use barrier protection     Sexual Activity:  Condom Use: Never  Number of Current Partners: 1  Number of Lifetime Partners: Unknown (reports innumerable casual sexual encounters)   History of STI's and Treatments: Unknown     REVIEW OF SYSTEMS:  Constitutional: [x] fevers [ ] chills [ ] fatigue [ ] malaise [ ] myalgias [ ] arthralgias [ ] weight loss  Eyes: [ ] double vision [ ] eye pain  [ ] visual changes  ENT: [ ] sore throat [ ] odynophagia [ ] mouth pain [ ] rhinorrhea  CV: [ ] chest pain [ ] shortness of breath  [ ] edema  Respiratory:  [ ] cough [ ] sputum production [ ] wheezing [ ] shortness of breath  GI: [ ] abdominal pain [ ] nausea [ ] vomiting [ ]  constipation [ ] diarrhea  : [ ] suprapubic pain [ ] dysuria [ ] polyuria [ ] penile/vaginal discharge [ ] genital lesions  Heme/Lymph: [ ] easy bleeding [ ] swollen lymph nodes  Skin: [ ] rashes [x] skin lesions  Neuro: [ ] headache [ ] neck tenderness [ ] focal motor weakness [ ] sensory changes [ ] paresthesias    PHYSICAL EXAM:    Vital Signs Last 24 Hrs  T(C): 37.2 (15 Jul 2020 08:40), Max: 37.3 (15 Jul 2020 06:24)  T(F): 99 (15 Jul 2020 08:40), Max: 99.2 (15 Jul 2020 06:24)  HR: 97 (15 Jul 2020 08:40) (90 - 104)  BP: 107/65 (15 Jul 2020 08:40) (104/68 - 124/73)  BP(mean): --  RR: 18 (15 Jul 2020 08:40) (16 - 18)  SpO2: 98% (15 Jul 2020 08:40) (98% - 100%)    General: AO x 3, NAD, Comfortable, Pleasant  HEENT: PERRL/ EOMI, no scleral icterus, MMM, no JVD, no thyromegaly, good dentition, no oropharyngeal lesions, no thrush  Respiratory: CTA b/l, no wheezes, rales or rhonchi  Cardiovascular: Regular rate and rhythm, +S1 + S2, no murmurs rubs or gallops  Abdomen: Soft, NTND, normoactive bowel sounds, no rebound, no guarding, no suprapubic tenderness  : No warts, vesicles, ulcerations, genital lesions, urethral discharge  Extremities: No cyanosis, no clubbing, no edema, pulses equal, no calf tenderness; RLE wrapped in ace bandage, swollen, erythematous, warm to touch, non tender and without any drainable collection   Skin: No rashes, skin lesions, palmar rash, or plantar rash  Lymphatic: No cervical/supraclavicular LAD  Lymph: No lymphadenopathy, enlargement or tenderness detected in the submandibular nodes, anterior cervical nodes, posterior cervical nodes, supraclavicular node, axillary nodes, inguinal nodes  Neurological: CN II-XII grossly intact, follows commands, moves all extremities        MEDICATIONS  (STANDING):  sodium chloride 0.9%. 1000 milliLiter(s) (1000 mL/Hr) IV Continuous <Continuous>  vancomycin  IVPB 1250 milliGRAM(s) IV Intermittent every 12 hours    MEDICATIONS  (PRN):      Allergies    Bactrim (Hives)  Truvada (Hives)    Intolerances      LABS:                        12.7   8.98  )-----------( 288      ( 15 Jul 2020 06:14 )             38.8                           12.7   8.98  )-----------( 288      ( 15 Jul 2020 06:14 )             38.8     Neutrophils:76.6   Bands:--   Lymphocytes:12.2   Monocytes:7.0   Eosinophils:3.2   Basophils:0.6   07-15 @ 06:14    07-15    136  |  103  |  16  ----------------------------<  103<H>  4.3   |  23  |  0.97    Ca    8.3<L>      15 Jul 2020 06:14  Mg     2.1     07-15    TPro  7.6  /  Alb  3.4  /  TBili  0.3  /  DBili  x   /  AST  15  /  ALT  17  /  AlkPhos  54  07-15              MICROBIOLOGY:      RADIOLOGY:    Assessment and Plan:   Mr. Brady is a 46 yo M with PMH of HIV (not on ARV) who presented for evaluation of unilateral lower extremity erythema and pain, now being treated for cellulitis. HIV team consulted to establish care.     #HIV (unknown VL, unknown CD4 count; not on treatment)   Patient initially diagnosed in 2005 and reports being enrolled in study at [facility] from 0326-2241 where he was observed off ART; briefly started ART in 2016 but discontinued in 2018 due to gaps in insurance  - given lack of history of oppurtunistic infections, lack of constitutional symptoms, and lack of physical exam findings suggestive of complete immunosuppression (no skin changes, no thrush, no LAD), patient likely has controller element  - Given relative lack of significant lymphopenia on CBC differential, CD4 count unlikely to be significantly reduced; f/u CD4 count and HIV viral load   - For now, will start on dual therapy with Dovato (can be ordered separately as Lamivudine + Dolutegravir)   - Genotype testing ordered   - Can follow up with Dr. Mars on DC at [facility]     Discussed with Dr. Mars; will continue to follow HIV CONSULT NOTE    CHIEF COMPLAINT:  Patient is a 45y old  Male who presents with a chief complaint of Cellulitis (15 Jul 2020 10:16)    HPI:  45M w/ pmhx of HIV (not currently on anti-retroviral therapy and unk last CD4/viral load count) and previous MRSA skin infections in 2006 and 2009, presenting with increased swelling, erythema and subjective fevers that have progressively worsened since 7/12. Starting 7/8 the patient noticed a circular raised red rash on the posterior R leg. Between 7/8 and 7/12 this grew in size after attempting to bring it down with warm compresses. The patient endorsed hot tub and public pool usage in [place] on 7/9 as well as beach during the week prior to noticing the initial lesion. On 7/12 the patient went to an urgent care where a I&D was performed and was sent home with clindamycin. The redness and swelling since th I&D spread down to and including the foot as well as up to the knee, but not including the knee. The patient endorses minimal pain and discomfort, as well as subjective fevers. Pt denies any joint pain, weakness, change in sensation, HA, or lightheadedness.     Outpatient HIV Provider: None  Year of HIV Diagnosis: 2005  T cell fang: unknown  Highest Viral Load: unknown  Current ARV regimen: not on ARV currently  ARV adherence: Was on for two years (6135-3320) before discontinuing due to insurance gaps   Previous ARV regimens: Truvada (reports previous reaction), -anavir (patient does not remember name) + additional agent  Hx of Past Oppurtunistic Infections: None     PAST MEDICAL & SURGICAL HISTORY:  HIV (human immunodeficiency virus infection)  No significant past surgical history      Social Hx:  Works as a   Lives in [place] (has lived in same [facility] for [facility] yrs)  No alcohol use, no smoking, reports casual drug use (cocaine, does not purchase, uses when available at friends home)     Sexual History:  MSM, receptive partner, same partner for at least last 6 months, partner is HIV negative and on PREP, does not use barrier protection     Sexual Activity:  Condom Use: Never  Number of Current Partners: 1  Number of Lifetime Partners: Unknown (reports innumerable casual sexual encounters)   History of STI's and Treatments: Unknown     REVIEW OF SYSTEMS:  Constitutional: [x] fevers [ ] chills [ ] fatigue [ ] malaise [ ] myalgias [ ] arthralgias [ ] weight loss  Eyes: [ ] double vision [ ] eye pain  [ ] visual changes  ENT: [ ] sore throat [ ] odynophagia [ ] mouth pain [ ] rhinorrhea  CV: [ ] chest pain [ ] shortness of breath  [ ] edema  Respiratory:  [ ] cough [ ] sputum production [ ] wheezing [ ] shortness of breath  GI: [ ] abdominal pain [ ] nausea [ ] vomiting [ ]  constipation [ ] diarrhea  : [ ] suprapubic pain [ ] dysuria [ ] polyuria [ ] penile/vaginal discharge [ ] genital lesions  Heme/Lymph: [ ] easy bleeding [ ] swollen lymph nodes  Skin: [ ] rashes [x] skin lesions  Neuro: [ ] headache [ ] neck tenderness [ ] focal motor weakness [ ] sensory changes [ ] paresthesias    PHYSICAL EXAM:    Vital Signs Last 24 Hrs  T(C): 37.2 (15 Jul 2020 08:40), Max: 37.3 (15 Jul 2020 06:24)  T(F): 99 (15 Jul 2020 08:40), Max: 99.2 (15 Jul 2020 06:24)  HR: 97 (15 Jul 2020 08:40) (90 - 104)  BP: 107/65 (15 Jul 2020 08:40) (104/68 - 124/73)  BP(mean): --  RR: 18 (15 Jul 2020 08:40) (16 - 18)  SpO2: 98% (15 Jul 2020 08:40) (98% - 100%)    General: AO x 3, NAD, Comfortable, Pleasant  HEENT: PERRL/ EOMI, no scleral icterus, MMM, no JVD, no thyromegaly, good dentition, no oropharyngeal lesions, no thrush  Respiratory: CTA b/l, no wheezes, rales or rhonchi  Cardiovascular: Regular rate and rhythm, +S1 + S2, no murmurs rubs or gallops  Abdomen: Soft, NTND, normoactive bowel sounds, no rebound, no guarding, no suprapubic tenderness  : No warts, vesicles, ulcerations, genital lesions, urethral discharge  Extremities: No cyanosis, no clubbing, no edema, pulses equal, no calf tenderness; RLE wrapped in ace bandage, swollen, erythematous, warm to touch, non tender and without any drainable collection   Skin: No rashes, skin lesions, palmar rash, or plantar rash  Lymphatic: No cervical/supraclavicular LAD  Lymph: No lymphadenopathy, enlargement or tenderness detected in the submandibular nodes, anterior cervical nodes, posterior cervical nodes, supraclavicular node, axillary nodes, inguinal nodes  Neurological: CN II-XII grossly intact, follows commands, moves all extremities        MEDICATIONS  (STANDING):  sodium chloride 0.9%. 1000 milliLiter(s) (1000 mL/Hr) IV Continuous <Continuous>  vancomycin  IVPB 1250 milliGRAM(s) IV Intermittent every 12 hours    MEDICATIONS  (PRN):      Allergies    Bactrim (Hives)  Truvada (Hives)    Intolerances      LABS:                        12.7   8.98  )-----------( 288      ( 15 Jul 2020 06:14 )             38.8                           12.7   8.98  )-----------( 288      ( 15 Jul 2020 06:14 )             38.8     Neutrophils:76.6   Bands:--   Lymphocytes:12.2   Monocytes:7.0   Eosinophils:3.2   Basophils:0.6   07-15 @ 06:14    07-15    136  |  103  |  16  ----------------------------<  103<H>  4.3   |  23  |  0.97    Ca    8.3<L>      15 Jul 2020 06:14  Mg     2.1     07-15    TPro  7.6  /  Alb  3.4  /  TBili  0.3  /  DBili  x   /  AST  15  /  ALT  17  /  AlkPhos  54  07-15              MICROBIOLOGY:      RADIOLOGY:    Assessment and Plan:   Mr. Brady is a 46 yo M with PMH of HIV (not on ARV) who presented for evaluation of unilateral lower extremity erythema and pain, now being treated for cellulitis. HIV team consulted to establish care.     #HIV (unknown VL, unknown CD4 count; not on treatment)   Patient initially diagnosed in 2005 and reports being enrolled in study at [facility] from 4823-2580 where he was observed off ART; briefly started ART in 2016 but discontinued in 2018 due to gaps in insurance  - given lack of history of oppurtunistic infections, lack of constitutional symptoms, and lack of physical exam findings suggestive of complete immunosuppression (no skin changes, no thrush, no LAD), patient likely has controller element  - Given relative lack of significant lymphopenia on CBC differential, CD4 count unlikely to be significantly reduced; f/u CD4 count and HIV viral load   - For now, will start on dual therapy with Dovato (can be ordered separately as Lamivudine + Dolutegravir)   - Genotype testing ordered   - Can follow up with Dr. Mars on DC at [facility]     Discussed with Dr. Mars; will continue to follow        I saw and examined this patient with  at bedside  I reviewed the labs and imaging  We provided some HIV education    Off ARVs for few years  Allergic to Truvada (?) and Bactrim    Education on dual therapy started and Dovato started today    [facility] FU upon discharge HIV CONSULT NOTE    CHIEF COMPLAINT:  Patient is a 45y old  Male who presents with a chief complaint of Cellulitis (15 Jul 2020 10:16)    HPI:  45M w/ pmhx of HIV (not currently on anti-retroviral therapy and unk last CD4/viral load count) and previous MRSA skin infections in 2006 and 2009, presenting with increased swelling, erythema and subjective fevers that have progressively worsened since 7/12. Starting 7/8 the patient noticed a circular raised red rash on the posterior R leg. Between 7/8 and 7/12 this grew in size after attempting to bring it down with warm compresses. The patient endorsed hot tub and public pool usage in [place] on 7/9 as well as beach during the week prior to noticing the initial lesion. On 7/12 the patient went to an urgent care where a I&D was performed and was sent home with clindamycin. The redness and swelling since th I&D spread down to and including the foot as well as up to the knee, but not including the knee. The patient endorses minimal pain and discomfort, as well as subjective fevers. Pt denies any joint pain, weakness, change in sensation, HA, or lightheadedness.     Outpatient HIV Provider: None  Year of HIV Diagnosis: 2005  T cell fang: unknown  Highest Viral Load: unknown  Current ARV regimen: not on ARV currently  ARV adherence: Was on for two years (4064-7703) before discontinuing due to insurance gaps   Previous ARV regimens: Truvada (reports previous reaction), -anavir (patient does not remember name) + additional agent  Hx of Past Oppurtunistic Infections: None     PAST MEDICAL & SURGICAL HISTORY:  HIV (human immunodeficiency virus infection)  No significant past surgical history      Social Hx:  Works as a   Lives in [place] (has lived in same [facility] for [facility] yrs)  No alcohol use, no smoking, reports casual drug use (cocaine, does not purchase, uses when available at friends home)     Sexual History:  MSM, receptive partner, same partner for at least last 6 months, partner is HIV negative and on PREP, does not use barrier protection     Sexual Activity:  Condom Use: Never  Number of Current Partners: 1  Number of Lifetime Partners: Unknown (reports innumerable casual sexual encounters)   History of STI's and Treatments: Unknown     REVIEW OF SYSTEMS:  Constitutional: [x] fevers [ ] chills [ ] fatigue [ ] malaise [ ] myalgias [ ] arthralgias [ ] weight loss  Eyes: [ ] double vision [ ] eye pain  [ ] visual changes  ENT: [ ] sore throat [ ] odynophagia [ ] mouth pain [ ] rhinorrhea  CV: [ ] chest pain [ ] shortness of breath  [ ] edema  Respiratory:  [ ] cough [ ] sputum production [ ] wheezing [ ] shortness of breath  GI: [ ] abdominal pain [ ] nausea [ ] vomiting [ ]  constipation [ ] diarrhea  : [ ] suprapubic pain [ ] dysuria [ ] polyuria [ ] penile/vaginal discharge [ ] genital lesions  Heme/Lymph: [ ] easy bleeding [ ] swollen lymph nodes  Skin: [ ] rashes [x] skin lesions  Neuro: [ ] headache [ ] neck tenderness [ ] focal motor weakness [ ] sensory changes [ ] paresthesias    PHYSICAL EXAM:    Vital Signs Last 24 Hrs  T(C): 37.2 (15 Jul 2020 08:40), Max: 37.3 (15 Jul 2020 06:24)  T(F): 99 (15 Jul 2020 08:40), Max: 99.2 (15 Jul 2020 06:24)  HR: 97 (15 Jul 2020 08:40) (90 - 104)  BP: 107/65 (15 Jul 2020 08:40) (104/68 - 124/73)  BP(mean): --  RR: 18 (15 Jul 2020 08:40) (16 - 18)  SpO2: 98% (15 Jul 2020 08:40) (98% - 100%)    General: AO x 3, NAD, Comfortable, Pleasant  HEENT: PERRL/ EOMI, no scleral icterus, MMM, no JVD, no thyromegaly, good dentition, no oropharyngeal lesions, no thrush  Respiratory: CTA b/l, no wheezes, rales or rhonchi  Cardiovascular: Regular rate and rhythm, +S1 + S2, no murmurs rubs or gallops  Abdomen: Soft, NTND, normoactive bowel sounds, no rebound, no guarding, no suprapubic tenderness  : No warts, vesicles, ulcerations, genital lesions, urethral discharge  Extremities: No cyanosis, no clubbing, no edema, pulses equal, no calf tenderness; RLE wrapped in ace bandage, swollen, erythematous, warm to touch, non tender and without any drainable collection   Skin: No rashes, skin lesions, palmar rash, or plantar rash  Lymphatic: No cervical/supraclavicular LAD  Lymph: No lymphadenopathy, enlargement or tenderness detected in the submandibular nodes, anterior cervical nodes, posterior cervical nodes, supraclavicular node, axillary nodes, inguinal nodes  Neurological: CN II-XII grossly intact, follows commands, moves all extremities        MEDICATIONS  (STANDING):  sodium chloride 0.9%. 1000 milliLiter(s) (1000 mL/Hr) IV Continuous <Continuous>  vancomycin  IVPB 1250 milliGRAM(s) IV Intermittent every 12 hours    MEDICATIONS  (PRN):      Allergies    Bactrim (Hives)  Truvada (Hives)    Intolerances      LABS:                        12.7   8.98  )-----------( 288      ( 15 Jul 2020 06:14 )             38.8                           12.7   8.98  )-----------( 288      ( 15 Jul 2020 06:14 )             38.8     Neutrophils:76.6   Bands:--   Lymphocytes:12.2   Monocytes:7.0   Eosinophils:3.2   Basophils:0.6   07-15 @ 06:14    07-15    136  |  103  |  16  ----------------------------<  103<H>  4.3   |  23  |  0.97    Ca    8.3<L>      15 Jul 2020 06:14  Mg     2.1     07-15    TPro  7.6  /  Alb  3.4  /  TBili  0.3  /  DBili  x   /  AST  15  /  ALT  17  /  AlkPhos  54  07-15              MICROBIOLOGY:      RADIOLOGY:    Assessment and Plan:   Mr. Brady is a 46 yo M with PMH of HIV (not on ARV) who presented for evaluation of unilateral lower extremity erythema and pain, now being treated for cellulitis. HIV team consulted to establish care.     #HIV (unknown VL, unknown CD4 count; not on treatment)   Patient initially diagnosed in 2005 and reports being enrolled in study at [facility] from 9291-3723 where he was observed off ART; briefly started ART in 2016 but discontinued in 2018 due to gaps in insurance  - given lack of history of oppurtunistic infections, lack of constitutional symptoms, and lack of physical exam findings suggestive of complete immunosuppression (no skin changes, no thrush, no LAD), patient likely has controller element  - Given relative lack of significant lymphopenia on CBC differential, CD4 count unlikely to be significantly reduced; f/u CD4 count and HIV viral load   - For now, will start on dual therapy with Dovato (can be ordered separately as Lamivudine + Dolutegravir)   - Genotype testing ordered   - Can follow up with Dr. Mars on DC at [facility]     Discussed with Dr. Mars; will continue to follow        I saw and examined this patient with  at bedside  I reviewed the labs and imaging  We provided some HIV education    Off ARVs for few years  Allergic to Truvada (?) and Bactrim    Education on dual therapy started and Dovato started today    [facility] FU upon discharge    PS:    CD4 just resulted at 103  VL still pending HIV CONSULT NOTE    CHIEF COMPLAINT:  Patient is a 45y old  Male who presents with a chief complaint of Cellulitis (15 Jul 2020 10:16)    HPI:  45M w/ pmhx of HIV (not currently on anti-retroviral therapy and unk last CD4/viral load count) and previous MRSA skin infections in 2006 and 2009, presenting with increased swelling, erythema and subjective fevers that have progressively worsened since 7/12. Starting 7/8 the patient noticed a circular raised red rash on the posterior R leg. Between 7/8 and 7/12 this grew in size after attempting to bring it down with warm compresses. The patient endorsed hot tub and public pool usage in [place] on 7/9 as well as beach during the week prior to noticing the initial lesion. On 7/12 the patient went to an urgent care where a I&D was performed and was sent home with clindamycin. The redness and swelling since th I&D spread down to and including the foot as well as up to the knee, but not including the knee. The patient endorses minimal pain and discomfort, as well as subjective fevers. Pt denies any joint pain, weakness, change in sensation, HA, or lightheadedness.     Outpatient HIV Provider: None  Year of HIV Diagnosis: 2005  T cell fang: unknown  Highest Viral Load: unknown  Current ARV regimen: not on ARV currently  ARV adherence: Was on for two years (5234-9064) before discontinuing due to insurance gaps   Previous ARV regimens: Truvada (reports previous reaction), -anavir (patient does not remember name) + additional agent  Hx of Past Oppurtunistic Infections: None     PAST MEDICAL & SURGICAL HISTORY:  HIV (human immunodeficiency virus infection)  No significant past surgical history      Social Hx:  Works as a   Lives in [email or web address] (has lived in same [facility] for [facility] yrs)  No alcohol use, no smoking, reports casual drug use (cocaine, does not purchase, uses when available at friends home)     Sexual History:  MSM, receptive partner, same partner for at least last 6 months, partner is HIV negative and on PREP, does not use barrier protection     Sexual Activity:  Condom Use: Never  Number of Current Partners: 1  Number of Lifetime Partners: Unknown (reports innumerable casual sexual encounters)   History of STI's and Treatments: Unknown     REVIEW OF SYSTEMS:  Constitutional: [x] fevers [ ] chills [ ] fatigue [ ] malaise [ ] myalgias [ ] arthralgias [ ] weight loss  Eyes: [ ] double vision [ ] eye pain  [ ] visual changes  ENT: [ ] sore throat [ ] odynophagia [ ] mouth pain [ ] rhinorrhea  CV: [ ] chest pain [ ] shortness of breath  [ ] edema  Respiratory:  [ ] cough [ ] sputum production [ ] wheezing [ ] shortness of breath  GI: [ ] abdominal pain [ ] nausea [ ] vomiting [ ]  constipation [ ] diarrhea  : [ ] suprapubic pain [ ] dysuria [ ] polyuria [ ] penile/vaginal discharge [ ] genital lesions  Heme/Lymph: [ ] easy bleeding [ ] swollen lymph nodes  Skin: [ ] rashes [x] skin lesions  Neuro: [ ] headache [ ] neck tenderness [ ] focal motor weakness [ ] sensory changes [ ] paresthesias    PHYSICAL EXAM:    Vital Signs Last 24 Hrs  T(C): 37.2 (15 Jul 2020 08:40), Max: 37.3 (15 Jul 2020 06:24)  T(F): 99 (15 Jul 2020 08:40), Max: 99.2 (15 Jul 2020 06:24)  HR: 97 (15 Jul 2020 08:40) (90 - 104)  BP: 107/65 (15 Jul 2020 08:40) (104/68 - 124/73)  BP(mean): --  RR: 18 (15 Jul 2020 08:40) (16 - 18)  SpO2: 98% (15 Jul 2020 08:40) (98% - 100%)    General: AO x 3, NAD, Comfortable, Pleasant  HEENT: PERRL/ EOMI, no scleral icterus, MMM, no JVD, no thyromegaly, good dentition, no oropharyngeal lesions, no thrush  Respiratory: CTA b/l, no wheezes, rales or rhonchi  Cardiovascular: Regular rate and rhythm, +S1 + S2, no murmurs rubs or gallops  Abdomen: Soft, NTND, normoactive bowel sounds, no rebound, no guarding, no suprapubic tenderness  : No warts, vesicles, ulcerations, genital lesions, urethral discharge  Extremities: No cyanosis, no clubbing, no edema, pulses equal, no calf tenderness; RLE wrapped in ace bandage, swollen, erythematous, warm to touch, non tender and without any drainable collection   Skin: No rashes, skin lesions, palmar rash, or plantar rash  Lymphatic: No cervical/supraclavicular LAD  Lymph: No lymphadenopathy, enlargement or tenderness detected in the submandibular nodes, anterior cervical nodes, posterior cervical nodes, supraclavicular node, axillary nodes, inguinal nodes  Neurological: CN II-XII grossly intact, follows commands, moves all extremities        MEDICATIONS  (STANDING):  sodium chloride 0.9%. 1000 milliLiter(s) (1000 mL/Hr) IV Continuous <Continuous>  vancomycin  IVPB 1250 milliGRAM(s) IV Intermittent every 12 hours    MEDICATIONS  (PRN):      Allergies    Bactrim (Hives)  Truvada (Hives)    Intolerances      LABS:                        12.7   8.98  )-----------( 288      ( 15 Jul 2020 06:14 )             38.8                           12.7   8.98  )-----------( 288      ( 15 Jul 2020 06:14 )             38.8     Neutrophils:76.6   Bands:--   Lymphocytes:12.2   Monocytes:7.0   Eosinophils:3.2   Basophils:0.6   07-15 @ 06:14    07-15    136  |  103  |  16  ----------------------------<  103<H>  4.3   |  23  |  0.97    Ca    8.3<L>      15 Jul 2020 06:14  Mg     2.1     07-15    TPro  7.6  /  Alb  3.4  /  TBili  0.3  /  DBili  x   /  AST  15  /  ALT  17  /  AlkPhos  54  07-15              MICROBIOLOGY:      RADIOLOGY:    Assessment and Plan:   Mr. Brady is a 46 yo M with PMH of HIV (not on ARV) who presented for evaluation of unilateral lower extremity erythema and pain, now being treated for cellulitis. HIV team consulted to establish care.     #HIV (unknown VL, unknown CD4 count; not on treatment)   Patient initially diagnosed in 2005 and reports being enrolled in study at [facility] from [id number] where he was observed off ART; briefly started ART in 2016 but discontinued in 2018 due to gaps in insurance  - given lack of history of oppurtunistic infections, lack of constitutional symptoms, and lack of physical exam findings suggestive of complete immunosuppression (no skin changes, no thrush, no LAD), patient likely has controller element  - Given relative lack of significant lymphopenia on CBC differential, CD4 count unlikely to be significantly reduced; f/u CD4 count and HIV viral load   - For now, will start on dual therapy with Dovato (can be ordered separately as Lamivudine + Dolutegravir)   - Genotype testing ordered   - Can follow up with Dr. Mars on DC at [facility]     Discussed with Dr. Mars; will continue to follow        I saw and examined this patient with  at bedside  I reviewed the labs and imaging  We provided some HIV education    Off ARVs for few years  Allergic to Truvada (?) and Bactrim    Education on dual therapy started and Dovato started today    RD FU upon discharge    PS:    CD4 just resulted at 103  VL still pending    Considering unexpected low CD4, I expect high VL and will not continue with Dovato    Please change ARVs to Biktarvy from tomorrow    Patient has already gotten first dose of dlutagravir and 3 TC, will add one dose of TAF tonight while still in hospital and under observation as has vague distant history of allergy to TDF

## 2020-07-15 NOTE — DISCHARGE NOTE PROVIDER - NSDCFUSCHEDAPPT_GEN_ALL_CORE_FT
LADAN MICHELLE ; 07/21/2020 ; NPP Med ID  [address] LADAN MCIHELLE ; 07/21/2020 ; NPP Med ID  [address]

## 2020-07-15 NOTE — DISCHARGE NOTE PROVIDER - CARE PROVIDER_API CALL
Vicky Mars  INTERNAL MEDICINE  [address] [id number]  Phone: [phone]  Fax: [phone]  Scheduled Appointment: 07/21/2020 10:00 AM

## 2020-07-15 NOTE — PROGRESS NOTE ADULT - ATTENDING COMMENTS
Clinically improving  Will dc zosyn at this time and c/w vancomycin alone for suspected MRSA infection given prior history of MRSA infection and current open wound, s/p I&D-purulent cellulitis, will f/up wound culture from OSH. Plan for doxy upon discharge. Duplex neg for DVT.  F/up vanco level, apprec surgery recs- c/w ACE wrap, LE elevation  Apprec HIV recs and d/w them, AIDS- added mepron for PJP and will be started on Biktarvy- will get a dose of TAF tonight to assess response given prior history of allergy to TDF  Anticipate dc home tomorrow  Rest as above

## 2020-07-15 NOTE — PROGRESS NOTE ADULT - ASSESSMENT
44 y/o Male pmh HIV with unknown viral load and CD4 count presents with 3 days of RLE pain and swelling 2/2 Cellulitis. Surgery consulted for concern for necrotizing fasciitis. Given CT and physical exam findings, less likely Necrotizing Fasciitis. Patient's pain and swelling is currently improving.    - continue to ace wrap RLE  - continue abx  - surgery team 4c will follow 44 y/o Male pmh HIV with unknown viral load and CD4 count presents with 3 days of RLE pain and swelling 2/2 Cellulitis. Surgery consulted for concern for necrotizing fasciitis. Given CT and physical exam findings, less likely Necrotizing Fasciitis. Patient's pain and swelling is currently improving.    - continue to ace wrap RLE  - continue abx  - surgery team 4c will follow  - discussed w/ Dr. Tobias

## 2020-07-15 NOTE — SBIRT NOTE ADULT - NSSBIRTDRGBRIEFINTDET_GEN_A_CORE
Patient reported occasional cocaine, crystal meth and marijuana use. Patient stated that he only uses socially. Patient stated that he has not used any substances since the pandemic. Patient has never been to rehab. SW offered patient resources; patient declined.

## 2020-07-15 NOTE — CHART NOTE - NSCHARTNOTEFT_GEN_A_CORE
Surgery Chief Resident's Note    45M w/ hx of HIV unknown CD4, not on antiretrovirals presenting with RLE pain. Patient reports abscess on right ankle s/p I+D at  3 days ago, leading to expression of significant pus. Since then his RLE has been swelling from toes to mid-shin with diffuse erythema. He denies significant pain, further drainage, or neuromuscular deficits. He reports similar infections in the past, most recently a MRSA infection 10 years ago of his LLE and groin s/p I+D followed by similar cellulitic findings. Patient is HDS. RLE lateral ankle prior I+D site with minimal drainage of pus. No areas of fluctuance. RLE is woody, indurated and globally erythematous. No crepitus. WBC is within normal limits, no hyponatremia. CT imaging of RLE shows fat stranding of subcutaneous tissue suggestive of cellulitis – no drainable collectors or gas noted. Patient does not warrant surgical intervention, but should be admitted to medicine for IV antibiotics to treat cellulitis. Given prior colonization with MRSA, would recommend treatment with vanc/Zosyn.

## 2020-07-15 NOTE — PROGRESS NOTE ADULT - PROBLEM SELECTOR PLAN 1
Pt presenting with worsening RLE swelling and redness s/p I&D and oral clindamycin. Not septic. Found to have cellulitis of the RLE. No R knee involvement. Hx of MRSA skin infections in 2006 and 2009.  - C/w Vanc for MRSA coverage  - f/u blood cultures  - Surgery consulted, no surgical intervention required

## 2020-07-15 NOTE — SBIRT NOTE ADULT - NSSBIRTBRIEFINTDET_GEN_A_CORE
Patient reported drinking a few times a month. Patient stated that he is a social drinker. SW offered patient resources; patient declined.

## 2020-07-15 NOTE — PROGRESS NOTE ADULT - PROBLEM SELECTOR PLAN 2
Pt has not been on anti-retro viral medications since 2017 after having hives on truvada.   - HIV team consulted, reccs appreciated.  - given dovato during the day today.  -  had reaction tenofovir in past but will trial him on a different form of it tonight and monitor as per HIV team recommendations.  CD4 is 103.

## 2020-07-15 NOTE — PROGRESS NOTE ADULT - PROBLEM SELECTOR PLAN 3
F: tolerating PO, no IVF  E: replete K<4, Mg<2  N: Regular     VTE Prophylaxis: None   C: Full Code  D: RMF, likely dispo tomorrow

## 2020-07-15 NOTE — DISCHARGE NOTE PROVIDER - HOSPITAL COURSE
#Discharge: do not delete        44 yo M w/ pmhx of HIV (not currently on anti-retroviral therapy and unk last CD4/viral load count) and previous MRSA skin infections in 2006 and 2009.    Presenting with increased swelling, erythema and subjective fevers that have progressively worsened since 7/12, found to have RLE cellulitis.         Problem List/Main Diagnoses (system-based):     SKIN    #Cellulitis - Pt s/p I&D and PO clindamycin (x2 days total) at an outisde urgent care ([facility]) on 07/12, now presenting with worsening R lower extremity edema, erythema, and mild drainage.     Not a surgical candidate, CT revealed cellulitis without abscess, gangrene, gas, or deep tissue involvement. Was given Vanco&Zosyn with symptomatology resolving.          IMMUNO    #HIV- Pt has tried truvada in the past but had hives reaction to medication.  2/2 insurance loss in the past, he is not on any antivral medication.     HIV team was consulted for potentially restarting medical therapy.         Inpatient treatment course: Pt presenting with worsening RLE swelling and redness s/p I&D and oral clindamycin. Not septic. Found to have cellulitis of the RLE. S/p Vanc/zosyn overnight, de-escalated to Vancomycin inpatient with symptoms resolving. Surgery consulted, no surgical intervention required. CD4 and viral load results pending, HIV team consulted. Now safe and stable for discharge with X days course of antibiotics.         New medications: Doxycycline    Labs to be followed outpatient: CD4 count and viral load.    Exam to be followed outpatient: Follow up with Dr. Mars in 2 weeks. #Discharge: do not delete        46 yo M w/ pmhx of HIV (not currently on anti-retroviral therapy and unk last CD4/viral load count) and previous MRSA skin infections in 2006 and 2009.    Presenting with increased swelling, erythema and subjective fevers that have progressively worsened since 7/12, found to have RLE cellulitis.         Problem List/Main Diagnoses (system-based):     SKIN    #Cellulitis - Pt s/p I&D and PO clindamycin (x2 days total) at an outisde urgent care ([facility]) on 07/12, now presenting with worsening R lower extremity edema, erythema, and mild drainage.     Not a surgical candidate, CT revealed cellulitis without abscess, gangrene, gas, or deep tissue involvement. Was given Vanco&Zosyn with symptomatology resolving.          IMMUNO    #HIV- Pt has tried truvada in the past but had hives reaction to medication.  2/2 insurance loss in the past, he is not on any antivral medication.     HIV team was consulted for potentially restarting medical therapy.         Inpatient treatment course: Pt presenting with worsening RLE swelling and redness s/p I&D and oral clindamycin. Not septic. Found to have cellulitis of the RLE. S/p Vanc/zosyn overnight, de-escalated to Vancomycin inpatient with symptoms resolving. Surgery consulted, no surgical intervention required. CD4 and viral load results pending, HIV team consulted recommending Dovato. Now safe and stable for discharge with 10 days course of doxycycline.        New medications: Doxycycline 100 BID 10 days, Dovato     Labs to be followed outpatient: CD4 count and viral load. Genetic testing as per HIV team.    Exam to be followed outpatient: Follow up with Dr. Mars in 1-2 weeks. #Discharge: do not delete        46 yo M w/ pmhx of HIV (not currently on anti-retroviral therapy and unk last CD4/viral load count) and previous MRSA skin infections in 2006 and 2009.    Presenting with increased swelling, erythema and subjective fevers that have progressively worsened since 7/12, found to have RLE cellulitis.         Problem List/Main Diagnoses (system-based):     SKIN    #Cellulitis - Pt s/p I&D and PO clindamycin (x2 days total) at an outisde urgent care ([facility]) on 07/12, now presenting with worsening R lower extremity edema, erythema, and mild drainage.     Not a surgical candidate, CT revealed cellulitis without abscess, gangrene, gas, or deep tissue involvement. Was given Vanco&Zosyn with symptomatology resolving. Will leave with doxycycline 100 mg BID.         IMMUNO    #HIV- Pt has tried truvada in the past but had hives reaction to medication.  2/2 insurance loss in the past, he is not on any antivral medication.     HIV team was consulted. Patient to leave with atovaqone and biktarvy.         Inpatient treatment course: Pt presenting with worsening RLE swelling and redness s/p I&D and oral clindamycin. Not septic. Found to have cellulitis of the RLE. S/p Vanc/zosyn overnight, de-escalated to Vancomycin inpatient with symptoms resolving. Surgery consulted, no surgical intervention required. CD4 and viral load results pending, HIV team consulted recommending Dovato. Now safe and stable for discharge with 10 days course of doxycycline.        New medications: Doxycycline 100 BID 10 days, Biktarvy     Labs to be followed outpatient: CD4 count and viral load. Genetic testing as per HIV team.    Exam to be followed outpatient: Follow up with Dr. Mars in 1-2 weeks. #Discharge: do not delete        46 yo M w/ pmhx of HIV (not currently on anti-retroviral therapy and unk last CD4/viral load count) and previous MRSA skin infections in 2006 and 2009.    Presenting with increased swelling, erythema and subjective fevers that have progressively worsened since 7/12, found to have RLE cellulitis.         Problem List/Main Diagnoses (system-based):     SKIN    #Cellulitis - Pt s/p I&D and PO clindamycin (x2 days total) at an out[facility]de urgent care ([facility] on 07/12, now presenting with worsening R lower extremity edema, erythema, and mild drainage.     Not a surgical candidate, CT revealed cellulitis without abscess, gangrene, gas, or deep tissue involvement. Was given Vanco&Zosyn with symptomatology resolving. Will leave with doxycycline 100 mg BID.         IMMUNO    #HIV- Pt has tried truvada in the past but had hives reaction to medication.  2/2 insurance loss in the past, he is not on any antivral medication.     HIV team was consulted. Patient to leave with atovaqone and biktarvy.         Inpatient treatment course: Pt presenting with worsening RLE swelling and redness s/p I&D and oral clindamycin. Not septic. Found to have cellulitis of the RLE. S/p Vanc/zosyn overnight, de-escalated to Vancomycin inpatient with symptoms resolving. Surgery consulted, no surgical intervention required. CD4 and viral load results pending, HIV team consulted recommending Biktarvy. Now safe and stable for discharge with 10 days course of doxycycline.        New medications: Doxycycline 100 BID 10 days, Biktarvy     Labs to be followed outpatient: CD4 count and viral load. Genetic testing as per HIV team.    Exam to be followed outpatient: Follow up with Dr. Mars in 1-2 weeks. #Discharge: do not delete        46 yo M w/ pmhx of HIV (not currently on anti-retroviral therapy and unk last CD4/viral load count) and previous MRSA skin infections in 2006 and 2009.    Presenting with increased swelling, erythema and subjective fevers that have progressively worsened since 7/12, found to have RLE cellulitis.         Problem List/Main Diagnoses (system-based):     SKIN    #Cellulitis - Pt s/p I&D and PO clindamycin (x2 days total) at an outisde urgent care ([facility] on 07/12, now presenting with worsening R lower extremity edema, erythema, and mild drainage.     Not a surgical candidate, CT revealed cellulitis without abscess, gangrene, gas, or deep tissue involvement. Was given Vanco&Zosyn with symptomatology resolving. Will leave with doxycycline 100 mg BID.         IMMUNO    #HIV- Pt has tried truvada in the past but had hives reaction to medication.  2/2 insurance loss in the past, he is not on any antiviral medication.     HIV team was consulted. Patient to leave with atovaqone and biktarvy.         Inpatient treatment course: Pt presenting with worsening RLE swelling and redness s/p I&D and oral clindamycin. Not septic. Found to have cellulitis of the RLE. S/p Vanc/zosyn overnight, de-escalated to Vancomycin inpatient with symptoms resolving. Surgery consulted, no surgical intervention required. CD4 and viral load results pending, HIV team consulted recommending Biktarvy. Now safe and stable for discharge with 10 days course of doxycycline.        New medications: Doxycycline 100 BID 10 days, Biktarvy, atovaquone     Labs to be followed outpatient: CD4 count and viral load. Genetic testing as per HIV team.    Exam to be followed outpatient: Follow up with Dr. Mars in 1-2 weeks.

## 2020-07-16 ENCOUNTER — TRANSCRIPTION ENCOUNTER (OUTPATIENT)
Age: 45
End: 2020-07-16

## 2020-07-16 VITALS
TEMPERATURE: 99 F | DIASTOLIC BLOOD PRESSURE: 68 MMHG | HEART RATE: 89 BPM | SYSTOLIC BLOOD PRESSURE: 114 MMHG | RESPIRATION RATE: 20 BRPM | OXYGEN SATURATION: 100 %

## 2020-07-16 LAB
ALBUMIN SERPL ELPH-MCNC: 2.9 G/DL — LOW (ref 3.3–5)
ALP SERPL-CCNC: 47 U/L — SIGNIFICANT CHANGE UP (ref 40–120)
ALT FLD-CCNC: 17 U/L — SIGNIFICANT CHANGE UP (ref 10–45)
ANION GAP SERPL CALC-SCNC: 10 MMOL/L — SIGNIFICANT CHANGE UP (ref 5–17)
AST SERPL-CCNC: 16 U/L — SIGNIFICANT CHANGE UP (ref 10–40)
BILIRUB SERPL-MCNC: <0.2 MG/DL — SIGNIFICANT CHANGE UP (ref 0.2–1.2)
BUN SERPL-MCNC: 12 MG/DL — SIGNIFICANT CHANGE UP (ref 7–23)
CALCIUM SERPL-MCNC: 8.2 MG/DL — LOW (ref 8.4–10.5)
CHLORIDE SERPL-SCNC: 103 MMOL/L — SIGNIFICANT CHANGE UP (ref 96–108)
CO2 SERPL-SCNC: 27 MMOL/L — SIGNIFICANT CHANGE UP (ref 22–31)
CREAT SERPL-MCNC: 1.09 MG/DL — SIGNIFICANT CHANGE UP (ref 0.5–1.3)
GLUCOSE SERPL-MCNC: 104 MG/DL — HIGH (ref 70–99)
HCT VFR BLD CALC: 37.7 % — LOW (ref 39–50)
HGB BLD-MCNC: 12.3 G/DL — LOW (ref 13–17)
MAGNESIUM SERPL-MCNC: 2.2 MG/DL — SIGNIFICANT CHANGE UP (ref 1.6–2.6)
MCHC RBC-ENTMCNC: 29.5 PG — SIGNIFICANT CHANGE UP (ref 27–34)
MCHC RBC-ENTMCNC: 32.6 GM/DL — SIGNIFICANT CHANGE UP (ref 32–36)
MCV RBC AUTO: 90.4 FL — SIGNIFICANT CHANGE UP (ref 80–100)
NRBC # BLD: 0 /100 WBCS — SIGNIFICANT CHANGE UP (ref 0–0)
PLATELET # BLD AUTO: 302 K/UL — SIGNIFICANT CHANGE UP (ref 150–400)
POTASSIUM SERPL-MCNC: 4 MMOL/L — SIGNIFICANT CHANGE UP (ref 3.5–5.3)
POTASSIUM SERPL-SCNC: 4 MMOL/L — SIGNIFICANT CHANGE UP (ref 3.5–5.3)
PROT SERPL-MCNC: 7.2 G/DL — SIGNIFICANT CHANGE UP (ref 6–8.3)
RBC # BLD: 4.17 M/UL — LOW (ref 4.2–5.8)
RBC # FLD: 12.8 % — SIGNIFICANT CHANGE UP (ref 10.3–14.5)
SODIUM SERPL-SCNC: 140 MMOL/L — SIGNIFICANT CHANGE UP (ref 135–145)
VANCOMYCIN TROUGH SERPL-MCNC: 8.3 UG/ML — LOW (ref 10–20)
WBC # BLD: 5.56 K/UL — SIGNIFICANT CHANGE UP (ref 3.8–10.5)
WBC # FLD AUTO: 5.56 K/UL — SIGNIFICANT CHANGE UP (ref 3.8–10.5)

## 2020-07-16 PROCEDURE — 82550 ASSAY OF CK (CPK): CPT

## 2020-07-16 PROCEDURE — 87906 NFCT AGT GNTYP ALYS HIV1: CPT

## 2020-07-16 PROCEDURE — 80202 ASSAY OF VANCOMYCIN: CPT

## 2020-07-16 PROCEDURE — 85027 COMPLETE CBC AUTOMATED: CPT

## 2020-07-16 PROCEDURE — 87536 HIV-1 QUANT&REVRSE TRNSCRPJ: CPT

## 2020-07-16 PROCEDURE — 96375 TX/PRO/DX INJ NEW DRUG ADDON: CPT

## 2020-07-16 PROCEDURE — 87040 BLOOD CULTURE FOR BACTERIA: CPT

## 2020-07-16 PROCEDURE — 96361 HYDRATE IV INFUSION ADD-ON: CPT

## 2020-07-16 PROCEDURE — 36415 COLL VENOUS BLD VENIPUNCTURE: CPT

## 2020-07-16 PROCEDURE — 86359 T CELLS TOTAL COUNT: CPT

## 2020-07-16 PROCEDURE — 99239 HOSP IP/OBS DSCHRG MGMT >30: CPT

## 2020-07-16 PROCEDURE — 87900 PHENOTYPE INFECT AGENT DRUG: CPT

## 2020-07-16 PROCEDURE — 83735 ASSAY OF MAGNESIUM: CPT

## 2020-07-16 PROCEDURE — 87635 SARS-COV-2 COVID-19 AMP PRB: CPT

## 2020-07-16 PROCEDURE — 86701 HIV-1ANTIBODY: CPT

## 2020-07-16 PROCEDURE — 93971 EXTREMITY STUDY: CPT

## 2020-07-16 PROCEDURE — 96365 THER/PROPH/DIAG IV INF INIT: CPT | Mod: XU

## 2020-07-16 PROCEDURE — 85025 COMPLETE CBC W/AUTO DIFF WBC: CPT

## 2020-07-16 PROCEDURE — 86702 HIV-2 ANTIBODY: CPT

## 2020-07-16 PROCEDURE — 80053 COMPREHEN METABOLIC PANEL: CPT

## 2020-07-16 PROCEDURE — 99285 EMERGENCY DEPT VISIT HI MDM: CPT | Mod: 25

## 2020-07-16 PROCEDURE — 73701 CT LOWER EXTREMITY W/DYE: CPT

## 2020-07-16 PROCEDURE — 87389 HIV-1 AG W/HIV-1&-2 AB AG IA: CPT

## 2020-07-16 RX ORDER — BICTEGRAVIR SODIUM, EMTRICITABINE, AND TENOFOVIR ALAFENAMIDE FUMARATE 30; 120; 15 MG/1; MG/1; MG/1
1 TABLET ORAL DAILY
Refills: 0 | Status: DISCONTINUED | OUTPATIENT
Start: 2020-07-16 | End: 2020-07-16

## 2020-07-16 RX ORDER — NICOTINE POLACRILEX 2 MG
2 GUM BUCCAL THREE TIMES A DAY
Refills: 0 | Status: DISCONTINUED | OUTPATIENT
Start: 2020-07-16 | End: 2020-07-16

## 2020-07-16 RX ORDER — ACETAMINOPHEN 500 MG
650 TABLET ORAL ONCE
Refills: 0 | Status: COMPLETED | OUTPATIENT
Start: 2020-07-16 | End: 2020-07-16

## 2020-07-16 RX ORDER — VANCOMYCIN HCL 1 G
1500 VIAL (EA) INTRAVENOUS EVERY 12 HOURS
Refills: 0 | Status: DISCONTINUED | OUTPATIENT
Start: 2020-07-16 | End: 2020-07-16

## 2020-07-16 RX ORDER — BICTEGRAVIR SODIUM, EMTRICITABINE, AND TENOFOVIR ALAFENAMIDE FUMARATE 30; 120; 15 MG/1; MG/1; MG/1
1 TABLET ORAL
Qty: 30 | Refills: 1
Start: 2020-07-16 | End: 2020-09-13

## 2020-07-16 RX ADMIN — Medication 650 MILLIGRAM(S): at 13:32

## 2020-07-16 RX ADMIN — ATOVAQUONE 1500 MILLIGRAM(S): 750 SUSPENSION ORAL at 13:13

## 2020-07-16 RX ADMIN — Medication 300 MILLIGRAM(S): at 09:33

## 2020-07-16 RX ADMIN — Medication 650 MILLIGRAM(S): at 12:32

## 2020-07-16 RX ADMIN — BICTEGRAVIR SODIUM, EMTRICITABINE, AND TENOFOVIR ALAFENAMIDE FUMARATE 1 TABLET(S): 30; 120; 15 TABLET ORAL at 13:13

## 2020-07-16 NOTE — DISCHARGE NOTE NURSING/CASE MANAGEMENT/SOCIAL WORK - PATIENT PORTAL LINK FT
You can access the FollowMyHealth Patient Portal offered by [facility] by registering at the following website: [email or web address]. By joining [employer]’s FollowMyHealth portal, you will also be able to view your health information using other applications (apps) compatible with our system.

## 2020-07-16 NOTE — PROGRESS NOTE ADULT - ASSESSMENT
46 y/o Male pmh HIV with unknown viral load and CD4 count presents with 3 days of RLE pain and swelling 2/2 Cellulitis. Surgery consulted for concern for necrotizing fasciitis. Given CT and physical exam findings, less likely Necrotizing Fasciitis. Patient's pain and swelling is currently improving.    - recommend wound care see pt for RLE ulceration prior to discharge  - continue abx  - continue HIV meds per ID  - discussed w/ Dr. Tobias

## 2020-07-16 NOTE — CHART NOTE - NSCHARTNOTEFT_GEN_A_CORE
OVERNIGHT EVENTS: PENG.     SUBJECTIVE / INTERVAL HPI: Patient seen and examined at bedside. NAD, resting comfortably in bed. Reports subjective improvement in leg swelling and is up to date in regards to plan for HIV care. No fevers, chills, chest pain, headache, shortness of breath. ROS otherwise negative.       PHYSICAL EXAM:    General: WDWN  HEENT: NC/AT; PERRL, anicteric sclera; MMM  Neck: supple  Cardiovascular: +S1/S2, RRR  Respiratory: CTA B/L; no W/R/R  Gastrointestinal: soft, NT/ND; +BSx4  Extremities: WWP; RLE assymetric and larger compared to L; small area of ulceration over anterior shin  Vascular: 2+ radial, DP/PT pulses B/L  Neurological: AAOx3; no focal deficits  Psychiatric: pleasant mood and affect  Dermatologic: no appreciable wounds or damage to the skin    VITAL SIGNS:  Vital Signs Last 24 Hrs  T(C): 37.3 (16 Jul 2020 08:45), Max: 37.3 (16 Jul 2020 08:45)  T(F): 99.1 (16 Jul 2020 08:45), Max: 99.1 (16 Jul 2020 08:45)  HR: 89 (16 Jul 2020 08:45) (71 - 89)  BP: 114/68 (16 Jul 2020 08:45) (107/64 - 115/66)  BP(mean): --  RR: 20 (16 Jul 2020 08:45) (18 - 20)  SpO2: 100% (16 Jul 2020 08:45) (98% - 100%)      MEDICATIONS:  MEDICATIONS  (STANDING):  atovaquone Suspension 1500 milliGRAM(s) Oral daily  bictegravir 50 mG/emtricitabine 200 mG/tenofovir alafenamide 25 mG (BIKTARVY) 1 Tablet(s) Oral daily  vancomycin  IVPB 1500 milliGRAM(s) IV Intermittent every 12 hours    MEDICATIONS  (PRN):  nicotine  Polacrilex Gum 2 milliGRAM(s) Oral three times a day PRN smoking craving      ALLERGIES:  Allergies    Bactrim (Hives)  Truvada (Hives)    Intolerances        LABS:                        12.3   5.56  )-----------( 302      ( 16 Jul 2020 07:59 )             37.7     07-16    140  |  103  |  12  ----------------------------<  104<H>  4.0   |  27  |  1.09    Ca    8.2<L>      16 Jul 2020 07:12  Mg     2.2     07-16    TPro  7.2  /  Alb  2.9<L>  /  TBili  <0.2  /  DBili  x   /  AST  16  /  ALT  17  /  AlkPhos  47  07-16        CAPILLARY BLOOD GLUCOSE          RADIOLOGY & ADDITIONAL TESTS: Reviewed.    Assessment and Plan:   Mr. Brady is a 46 yo M with PMH of HIV (not on ARV) who presented for evaluation of unilateral lower extremity erythema and pain, now being treated for cellulitis. HIV team consulted to establish care.     #HIV (unknown VL, unknown CD4 count; not on treatment)   Patient initially diagnosed in 2005 and reports being enrolled in study at [facility] from [id number] where he was observed off ART; briefly started ART in 2016 but discontinued in 2018 due to gaps in insurance  - CD4 resulted at 103; suspecting likely higher VL in light of low CD4 count  - DC Dovato and start on Biktarvy; received tenfovir compononent overnight and tolerated without any adverse effects   - Please continue biktarvy on DC   - Start atovaqoune PPX (patient has bactrim allergy) given CD4 count 100   - Can follow up genotype testing on outpatient follow up   - f/u at [facility] after discharge

## 2020-07-16 NOTE — PROGRESS NOTE ADULT - SUBJECTIVE AND OBJECTIVE BOX
LADAN MICHELLE, 45y, Male  MRN-[id number]  Patient is a 45y old  Male who presents with a chief complaint of Cellulitis (15 Jul 2020 12:28)  SUBJECTIVE: Pt seen and examined at bedside. Has ace wrap and dressing around RLE wound. Says surgery had changed it out this AM. States feeling much better this morning and that the swelling in the leg has greatly reduced in size as compared to admission.   -------------------------------------------------------------------------------  VITAL SIGNS:  Vital Signs Last 24 Hrs  T(C): 37.2 (15 Jul 2020 08:40), Max: 37.3 (15 Jul 2020 06:24)  T(F): 99 (15 Jul 2020 08:40), Max: 99.2 (15 Jul 2020 06:24)  HR: 97 (15 Jul 2020 08:40) (90 - 104)  BP: 107/65 (15 Jul 2020 08:40) (104/68 - 124/73)  BP(mean): --  RR: 18 (15 Jul 2020 08:40) (16 - 18)  SpO2: 98% (15 Jul 2020 08:40) (98% - 100%)  I&O's Summary      PHYSICAL EXAM:    General: NAD, well developed  HEENT: NC/AT; EOMI, PERRLA, anicteric sclera; moist mucosal membranes.  Neck: supple, trachea midline  Cardiovascular: RRR, +S1/S2; NO M/R/G  Respiratory: CTA B/L; no W/R/R  Gastrointestinal: soft, NT/ND; +BSx4  Extremities: WWP; RLE atleast 2inches larger than other calf. Erythematous and 2+ nonpitting edema on R calf and foot extending to just below knee. Mildly tender to palpation. Draining serosanguinous not purulent fluid.  Vascular: 2+ radial, DP pulses B/L  Neurological: AAOx3; no focal deficits    ALLERGIES:  Allergies    Bactrim (Hives)  Truvada (Hives)    Intolerances        MEDICATIONS:  MEDICATIONS  (STANDING):  atovaquone Suspension 1500 milliGRAM(s) Oral daily  vancomycin  IVPB 1250 milliGRAM(s) IV Intermittent every 12 hours    MEDICATIONS  (PRN):      -------------------------------------------------------------------------------  LABS:                        12.7   8.98  )-----------( 288      ( 15 Jul 2020 06:14 )             38.8     07-15    136  |  103  |  16  ----------------------------<  103<H>  4.3   |  23  |  0.97    Ca    8.3<L>      15 Jul 2020 06:14  Mg     2.1     07-15    TPro  7.6  /  Alb  3.4  /  TBili  0.3  /  DBili  x   /  AST  15  /  ALT  17  /  AlkPhos  54  07-15    LIVER FUNCTIONS - ( 15 Jul 2020 06:14 )  Alb: 3.4 g/dL / Pro: 7.6 g/dL / ALK PHOS: 54 U/L / ALT: 17 U/L / AST: 15 U/L / GGT: x           T Cell Subset (07.15.20 @ 14:35)    CD8 %: 70 %    ABS CD3: 926 /uL    ABS CD4: 103 /uL    ABS CD8: 787 /uL    CD3 %: 82 %    CD4 %: 9 %      RADIOLOGY & ADDITIONAL TESTS: Reviewed.
DAILY PROGRESS NOTE:    S: Afebrile, patient states that pain and swelling is improving.     O:    Vital Signs Last 24 Hrs  T(C): 37.3 (15 Jul 2020 06:24), Max: 37.3 (15 Jul 2020 06:24)  T(F): 99.2 (15 Jul 2020 06:24), Max: 99.2 (15 Jul 2020 06:24)  HR: 102 (15 Jul 2020 06:24) (90 - 104)  BP: 104/68 (15 Jul 2020 06:24) (104/68 - 124/73)  BP(mean): --  RR: 18 (15 Jul 2020 06:24) (16 - 18)  SpO2: 98% (15 Jul 2020 06:24) (98% - 100%)    I&O's Detail      Physical Exam:    General: NAD  Pulm: normal resp effort and excursion  Extremities: WWP. 2+ edema in the R foot, R LE is swollen and tense from the ankle to just below the knee. Erythematous and warm from below knee to and including the foot. Small scabbing near R lateral malleolus. No crepitus, no TTP. Palpable DP pulse.     LABS:                        12.7   8.98  )-----------( 288      ( 15 Jul 2020 06:14 )             38.8     07-14    141  |  102  |  16  ----------------------------<  73  4.0   |  27  |  1.10    Ca    9.0      14 Jul 2020 18:09    TPro  8.2  /  Alb  3.6  /  TBili  0.6  /  DBili  x   /  AST  16  /  ALT  19  /  AlkPhos  45  07-14          RADIOLOGY & ADDITIONAL STUDIES:
DAILY PROGRESS NOTE:    S: Pt states leg feeling significantly improved. No fevers overnight.    O:    Vital Signs Last 24 Hrs  T(C): 36.9 (16 Jul 2020 06:08), Max: 37.2 (15 Jul 2020 08:40)  T(F): 98.5 (16 Jul 2020 06:08), Max: 99 (15 Jul 2020 08:40)  HR: 71 (16 Jul 2020 06:08) (71 - 97)  BP: 115/66 (16 Jul 2020 06:08) (107/64 - 115/66)  BP(mean): --  RR: 18 (16 Jul 2020 06:08) (18 - 18)  SpO2: 98% (16 Jul 2020 06:08) (98% - 100%)    I&O's Detail    15 Jul 2020 07:01  -  16 Jul 2020 07:00  --------------------------------------------------------  IN:    IV PiggyBack: 250 mL  Total IN: 250 mL    OUT:  Total OUT: 0 mL    Total NET: 250 mL          Physical Exam:    Gen: NAD  Pulm: normal resp effort and excursion  Ext: RLE erythema below knee and swelling significantly improved, ulceration near lateral malleolus w/ some exudate however does not appear purulent    LABS:                        12.7   8.98  )-----------( 288      ( 15 Jul 2020 06:14 )             38.8     07-15    136  |  103  |  16  ----------------------------<  103<H>  4.3   |  23  |  0.97    Ca    8.3<L>      15 Jul 2020 06:14  Mg     2.1     07-15    TPro  7.6  /  Alb  3.4  /  TBili  0.3  /  DBili  x   /  AST  15  /  ALT  17  /  AlkPhos  54  07-15          RADIOLOGY & ADDITIONAL STUDIES:
LADAN MICHELLE, 45y, Male  [id number]  Patient is a 45y old  Male who presents with a chief complaint of Cellulitis (15 Jul 2020 07:04)      HPI:     SUBJECTIVE:  -------------------------------------------------------------------------------  VITAL SIGNS:  Vital Signs Last 24 Hrs  T(C): 37.2 (15 Jul 2020 08:40), Max: 37.3 (15 Jul 2020 06:24)  T(F): 99 (15 Jul 2020 08:40), Max: 99.2 (15 Jul 2020 06:24)  HR: 97 (15 Jul 2020 08:40) (90 - 104)  BP: 107/65 (15 Jul 2020 08:40) (104/68 - 124/73)  BP(mean): --  RR: 18 (15 Jul 2020 08:40) (16 - 18)  SpO2: 98% (15 Jul 2020 08:40) (98% - 100%)  I&O's Summary      PHYSICAL EXAM:    General: NAD, well developed  HEENT: NC/AT; EOMI, PERRLA, anicteric sclera; moist mucosal membranes.  Neck: supple, trachea midline  Cardiovascular: RRR, +S1/S2; NO M/R/G  Respiratory: CTA B/L; no W/R/R  Gastrointestinal: soft, NT/ND; +BSx4  Extremities: WWP; no edema or cyanosis  Vascular: 2+ radial, DP/PT pulses B/L  Neurological: AAOx3; no focal deficits    ALLERGIES:  Allergies    Bactrim (Hives)  Truvada (Hives)    Intolerances        MEDICATIONS:  MEDICATIONS  (STANDING):  sodium chloride 0.9%. 1000 milliLiter(s) (1000 mL/Hr) IV Continuous <Continuous>  vancomycin  IVPB 1250 milliGRAM(s) IV Intermittent every 12 hours    MEDICATIONS  (PRN):      -------------------------------------------------------------------------------  LABS:                        12.7   8.98  )-----------( 288      ( 15 Jul 2020 06:14 )             38.8     07-15    136  |  103  |  16  ----------------------------<  103<H>  4.3   |  23  |  0.97    Ca    8.3<L>      15 Jul 2020 06:14  Mg     2.1     07-15    TPro  7.6  /  Alb  3.4  /  TBili  0.3  /  DBili  x   /  AST  15  /  ALT  17  /  AlkPhos  54  07-15    LIVER FUNCTIONS - ( 15 Jul 2020 06:14 )  Alb: 3.4 g/dL / Pro: 7.6 g/dL / ALK PHOS: 54 U/L / ALT: 17 U/L / AST: 15 U/L / GGT: x               CAPILLARY BLOOD GLUCOSE          RADIOLOGY & ADDITIONAL TESTS: Reviewed.

## 2020-07-17 LAB
HIV-1 VIRAL LOAD RESULT: ABNORMAL
HIV1 RNA # SERPL NAA+PROBE: SIGNIFICANT CHANGE UP
HIV1 RNA SER-IMP: SIGNIFICANT CHANGE UP
HIV1 RNA SERPL NAA+PROBE-ACNC: ABNORMAL
HIV1 RNA SERPL NAA+PROBE-LOG#: 4.89 — SIGNIFICANT CHANGE UP

## 2020-07-17 RX ORDER — ATOVAQUONE 750 MG/5ML
10 SUSPENSION ORAL
Qty: 300 | Refills: 0
Start: 2020-07-17 | End: 2020-08-15

## 2020-07-17 RX ORDER — BICTEGRAVIR SODIUM, EMTRICITABINE, AND TENOFOVIR ALAFENAMIDE FUMARATE 30; 120; 15 MG/1; MG/1; MG/1
1 TABLET ORAL
Qty: 30 | Refills: 1
Start: 2020-07-17 | End: 2020-09-14

## 2020-07-19 LAB
CULTURE RESULTS: SIGNIFICANT CHANGE UP
CULTURE RESULTS: SIGNIFICANT CHANGE UP
SPECIMEN SOURCE: SIGNIFICANT CHANGE UP
SPECIMEN SOURCE: SIGNIFICANT CHANGE UP

## 2020-07-20 ENCOUNTER — TRANSCRIPTION ENCOUNTER (OUTPATIENT)
Age: 45
End: 2020-07-20

## 2020-07-20 PROBLEM — B20 HUMAN IMMUNODEFICIENCY VIRUS [HIV] DISEASE: Chronic | Status: ACTIVE | Noted: 2020-07-14

## 2020-07-21 ENCOUNTER — APPOINTMENT (OUTPATIENT)
Dept: INFECTIOUS DISEASE | Facility: CLINIC | Age: 45
End: 2020-07-21
Payer: MEDICAID

## 2020-07-21 ENCOUNTER — OUTPATIENT (OUTPATIENT)
Dept: OUTPATIENT SERVICES | Facility: HOSPITAL | Age: 45
LOS: 1 days | End: 2020-07-21
Payer: MEDICAID

## 2020-07-21 ENCOUNTER — RESULT REVIEW (OUTPATIENT)
Age: 45
End: 2020-07-21

## 2020-07-21 VITALS
DIASTOLIC BLOOD PRESSURE: 61 MMHG | TEMPERATURE: 97.1 F | BODY MASS INDEX: 23.3 KG/M2 | HEART RATE: 94 BPM | RESPIRATION RATE: 14 BRPM | WEIGHT: 172 LBS | OXYGEN SATURATION: 99 % | HEIGHT: 72 IN | SYSTOLIC BLOOD PRESSURE: 120 MMHG

## 2020-07-21 DIAGNOSIS — Z88.1 ALLERGY STATUS TO OTHER ANTIBIOTIC AGENTS STATUS: ICD-10-CM

## 2020-07-21 DIAGNOSIS — B20 HUMAN IMMUNODEFICIENCY VIRUS [HIV] DISEASE: ICD-10-CM

## 2020-07-21 DIAGNOSIS — Z79.899 OTHER LONG TERM (CURRENT) DRUG THERAPY: ICD-10-CM

## 2020-07-21 DIAGNOSIS — B00.1 HERPESVIRAL VESICULAR DERMATITIS: ICD-10-CM

## 2020-07-21 DIAGNOSIS — L03.115 CELLULITIS OF RIGHT LOWER LIMB: ICD-10-CM

## 2020-07-21 DIAGNOSIS — F14.90 COCAINE USE, UNSPECIFIED, UNCOMPLICATED: ICD-10-CM

## 2020-07-21 DIAGNOSIS — L02.415 CUTANEOUS ABSCESS OF RIGHT LOWER LIMB: ICD-10-CM

## 2020-07-21 DIAGNOSIS — Z88.8 ALLERGY STATUS TO OTHER DRUGS, MEDICAMENTS AND BIOLOGICAL SUBSTANCES: ICD-10-CM

## 2020-07-21 DIAGNOSIS — Z00.00 ENCOUNTER FOR GENERAL ADULT MEDICAL EXAMINATION W/OUT ABNORMAL FINDINGS: ICD-10-CM

## 2020-07-21 DIAGNOSIS — Z72.0 TOBACCO USE: ICD-10-CM

## 2020-07-21 DIAGNOSIS — Z21 ASYMPTOMATIC HUMAN IMMUNODEFICIENCY VIRUS [HIV] INFECTION STATUS: ICD-10-CM

## 2020-07-21 DIAGNOSIS — Z72.52 HIGH RISK HOMOSEXUAL BEHAVIOR: ICD-10-CM

## 2020-07-21 LAB
A1C WITH ESTIMATED AVERAGE GLUCOSE RESULT: 5.1 % — SIGNIFICANT CHANGE UP (ref 4–5.6)
ALBUMIN SERPL ELPH-MCNC: 3.9 G/DL — SIGNIFICANT CHANGE UP (ref 3.3–5)
ALP SERPL-CCNC: 50 U/L — SIGNIFICANT CHANGE UP (ref 40–120)
ALT FLD-CCNC: 22 U/L — SIGNIFICANT CHANGE UP (ref 10–45)
ANION GAP SERPL CALC-SCNC: 13 MMOL/L — SIGNIFICANT CHANGE UP (ref 5–17)
APPEARANCE UR: CLEAR — SIGNIFICANT CHANGE UP
AST SERPL-CCNC: 22 U/L — SIGNIFICANT CHANGE UP (ref 10–40)
BASOPHILS # BLD AUTO: 0.03 K/UL — SIGNIFICANT CHANGE UP (ref 0–0.2)
BASOPHILS NFR BLD AUTO: 0.6 % — SIGNIFICANT CHANGE UP (ref 0–2)
BILIRUB SERPL-MCNC: 0.3 MG/DL — SIGNIFICANT CHANGE UP (ref 0.2–1.2)
BILIRUB UR-MCNC: NEGATIVE — SIGNIFICANT CHANGE UP
BUN SERPL-MCNC: 14 MG/DL — SIGNIFICANT CHANGE UP (ref 7–23)
CALCIUM SERPL-MCNC: 9.3 MG/DL — SIGNIFICANT CHANGE UP (ref 8.4–10.5)
CHLORIDE SERPL-SCNC: 108 MMOL/L — SIGNIFICANT CHANGE UP (ref 96–108)
CHOLEST SERPL-MCNC: 122 MG/DL — SIGNIFICANT CHANGE UP (ref 10–199)
CO2 SERPL-SCNC: 23 MMOL/L — SIGNIFICANT CHANGE UP (ref 22–31)
COLOR SPEC: YELLOW — SIGNIFICANT CHANGE UP
CREAT SERPL-MCNC: 1.04 MG/DL — SIGNIFICANT CHANGE UP (ref 0.5–1.3)
DIFF PNL FLD: NEGATIVE — SIGNIFICANT CHANGE UP
EOSINOPHIL # BLD AUTO: 0.13 K/UL — SIGNIFICANT CHANGE UP (ref 0–0.5)
EOSINOPHIL NFR BLD AUTO: 2.6 % — SIGNIFICANT CHANGE UP (ref 0–6)
ESTIMATED AVERAGE GLUCOSE: 100 MG/DL — SIGNIFICANT CHANGE UP (ref 68–114)
GLUCOSE SERPL-MCNC: 90 MG/DL — SIGNIFICANT CHANGE UP (ref 70–99)
GLUCOSE UR QL: NEGATIVE — SIGNIFICANT CHANGE UP
HBV CORE AB SER-ACNC: SIGNIFICANT CHANGE UP
HBV SURFACE AB SER-ACNC: REACTIVE — SIGNIFICANT CHANGE UP
HBV SURFACE AG SER-ACNC: SIGNIFICANT CHANGE UP
HCT VFR BLD CALC: 42.7 % — SIGNIFICANT CHANGE UP (ref 39–50)
HCV AB S/CO SERPL IA: 0.29 S/CO — SIGNIFICANT CHANGE UP
HCV AB SERPL-IMP: SIGNIFICANT CHANGE UP
HDLC SERPL-MCNC: 29 MG/DL — LOW
HGB BLD-MCNC: 13.7 G/DL — SIGNIFICANT CHANGE UP (ref 13–17)
IMM GRANULOCYTES NFR BLD AUTO: 1.2 % — SIGNIFICANT CHANGE UP (ref 0–1.5)
KETONES UR-MCNC: NEGATIVE — SIGNIFICANT CHANGE UP
LEUKOCYTE ESTERASE UR-ACNC: NEGATIVE — SIGNIFICANT CHANGE UP
LIPID PNL WITH DIRECT LDL SERPL: 76 MG/DL — SIGNIFICANT CHANGE UP
LYMPHOCYTES # BLD AUTO: 1.36 K/UL — SIGNIFICANT CHANGE UP (ref 1–3.3)
LYMPHOCYTES # BLD AUTO: 27 % — SIGNIFICANT CHANGE UP (ref 13–44)
MCHC RBC-ENTMCNC: 29.2 PG — SIGNIFICANT CHANGE UP (ref 27–34)
MCHC RBC-ENTMCNC: 32.1 GM/DL — SIGNIFICANT CHANGE UP (ref 32–36)
MCV RBC AUTO: 91 FL — SIGNIFICANT CHANGE UP (ref 80–100)
MONOCYTES # BLD AUTO: 0.47 K/UL — SIGNIFICANT CHANGE UP (ref 0–0.9)
MONOCYTES NFR BLD AUTO: 9.3 % — SIGNIFICANT CHANGE UP (ref 2–14)
NEUTROPHILS # BLD AUTO: 2.98 K/UL — SIGNIFICANT CHANGE UP (ref 1.8–7.4)
NEUTROPHILS NFR BLD AUTO: 59.3 % — SIGNIFICANT CHANGE UP (ref 43–77)
NITRITE UR-MCNC: NEGATIVE — SIGNIFICANT CHANGE UP
NRBC # BLD: 0 /100 WBCS — SIGNIFICANT CHANGE UP (ref 0–0)
PH UR: 6 — SIGNIFICANT CHANGE UP (ref 5–8)
PLATELET # BLD AUTO: 432 K/UL — HIGH (ref 150–400)
POTASSIUM SERPL-MCNC: 4.1 MMOL/L — SIGNIFICANT CHANGE UP (ref 3.5–5.3)
POTASSIUM SERPL-SCNC: 4.1 MMOL/L — SIGNIFICANT CHANGE UP (ref 3.5–5.3)
PROT SERPL-MCNC: 8.9 G/DL — HIGH (ref 6–8.3)
PROT UR-MCNC: NEGATIVE MG/DL — SIGNIFICANT CHANGE UP
RBC # BLD: 4.69 M/UL — SIGNIFICANT CHANGE UP (ref 4.2–5.8)
RBC # FLD: 12.8 % — SIGNIFICANT CHANGE UP (ref 10.3–14.5)
SODIUM SERPL-SCNC: 144 MMOL/L — SIGNIFICANT CHANGE UP (ref 135–145)
SP GR SPEC: >=1.03 — SIGNIFICANT CHANGE UP (ref 1–1.03)
TOTAL CHOLESTEROL/HDL RATIO MEASUREMENT: 4.2 RATIO — SIGNIFICANT CHANGE UP (ref 3.4–9.6)
TRIGL SERPL-MCNC: 86 MG/DL — SIGNIFICANT CHANGE UP (ref 10–149)
UROBILINOGEN FLD QL: 0.2 E.U./DL — SIGNIFICANT CHANGE UP
WBC # BLD: 5.03 K/UL — SIGNIFICANT CHANGE UP (ref 3.8–10.5)
WBC # FLD AUTO: 5.03 K/UL — SIGNIFICANT CHANGE UP (ref 3.8–10.5)

## 2020-07-21 PROCEDURE — 99215 OFFICE O/P EST HI 40 MIN: CPT | Mod: GC

## 2020-07-21 PROCEDURE — 88112 CYTOPATH CELL ENHANCE TECH: CPT | Mod: 26

## 2020-07-21 NOTE — HEALTH RISK ASSESSMENT
[No] : No [Yes] : In the past 12 months have you used drugs other than those required for medical reasons? Yes [Never (0 pts)] : Never (0 points) [No falls in past year] : Patient reported no falls in the past year [0] : 2) Feeling down, depressed, or hopeless: Not at all (0) [Alone] : lives alone [Single] : single [Employed] : employed [Fully functional (bathing, dressing, toileting, transferring, walking, feeding)] : Fully functional (bathing, dressing, toileting, transferring, walking, feeding) [Fully functional (using the telephone, shopping, preparing meals, housekeeping, doing laundry, using] : Fully functional and needs no help or supervision to perform IADLs (using the telephone, shopping, preparing meals, housekeeping, doing laundry, using transportation, managing medications and managing finances) [Audit-CScore] : 0 [de-identified] : Cocaine [[id number]] : 0

## 2020-07-21 NOTE — REVIEW OF SYSTEMS
[Skin Rash] : skin rash [Fever] : no fever [Chills] : no chills [Fatigue] : no fatigue [Recent Change In Weight] : ~T no recent weight change [Vision Problems] : no vision problems [Hearing Loss] : no hearing loss [Sore Throat] : no sore throat [Palpitations] : no palpitations [Chest Pain] : no chest pain [Lower Ext Edema] : no lower extremity edema [Shortness Of Breath] : no shortness of breath [Wheezing] : no wheezing [Cough] : no cough [Dyspnea on Exertion] : not dyspnea on exertion [Nausea] : no nausea [Abdominal Pain] : no abdominal pain [Vomiting] : no vomiting [Dysuria] : no dysuria [Joint Pain] : no joint pain [Hematuria] : no hematuria [Incontinence] : no incontinence [Headache] : no headache [Back Pain] : no back pain [Insomnia] : no insomnia [Memory Loss] : no memory loss [Anxiety] : no anxiety [Depression] : no depression [Easy Bleeding] : no easy bleeding [Swollen Glands] : no swollen glands [Easy Bruising] : no easy bruising

## 2020-07-21 NOTE — HISTORY OF PRESENT ILLNESS
[FreeTextEntry1] : Reports to establish care after recent hospital discharge.  [de-identified] : Mr. Brady is a 46 yo MSM w/ HIV (diagnosed 2005, briefly on ARVs in 2016 before discontinuing in 2018 due to insurance gaps), who presents to establish care after recent hospital admission for MRSA cellulitis. Patient reported being in [place] prior to his admission in early July, when he experienced an abrasion to his RLE that subsequently became erythematous with fluctuant area around his lateral ankle. He initially presented to an urgent care where his fluctuant area was incised and drained and he was sent home on clindamycin. He reported to the hospital due to worsening erythema while on antibiotics and subjective fevers. He was treated for MRSA cellulitis given purulence and DCd on oral abx. Of note, he has had MRSA cellulitis x2 in the past (most recently 2009). \par \par During his hospital admission, he was started on Biktarvy and atovaqoune (PCP PPX, patient has bactrim allergy) which he tolerated without side effects. He was diagnosed with HIV in 2005 and was enrolled in a study at [facility] where his CD4 count and HIV viral load was monitored off ARVs. He established care with an HIV specialist in 2016, but due to gaps in insurance was unable to follow up after 2018 and discontinued ARVs. 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 He otherwise reports no other history of opportunistic infections. \par \par He is sexually active with one male and is the receptive partner. Reports inconsistent use of barrier contraception. Is concerned that his partner may be sexually active with more than one person.

## 2020-07-21 NOTE — PHYSICAL EXAM
[No Acute Distress] : no acute distress [Well Developed] : well developed [Well Nourished] : well nourished [Normal Sclera/Conjunctiva] : normal sclera/conjunctiva [Well-Appearing] : well-appearing [EOMI] : extraocular movements intact [PERRL] : pupils equal round and reactive to light [Normal Oropharynx] : the oropharynx was normal [Normal Outer Ear/Nose] : the outer ears and nose were normal in appearance [No Lymphadenopathy] : no lymphadenopathy [No JVD] : no jugular venous distention [Supple] : supple [No Respiratory Distress] : no respiratory distress  [Clear to Auscultation] : lungs were clear to auscultation bilaterally [No Accessory Muscle Use] : no accessory muscle use [Normal S1, S2] : normal S1 and S2 [Regular Rhythm] : with a regular rhythm [Pedal Pulses Present] : the pedal pulses are present [No Murmur] : no murmur heard [No Extremity Clubbing/Cyanosis] : no extremity clubbing/cyanosis [Soft] : abdomen soft [Non-distended] : non-distended [Normal Posterior Cervical Nodes] : no posterior cervical lymphadenopathy [Non Tender] : non-tender [No CVA Tenderness] : no CVA  tenderness [Normal Anterior Cervical Nodes] : no anterior cervical lymphadenopathy [No Spinal Tenderness] : no spinal tenderness [No Joint Swelling] : no joint swelling [No Focal Deficits] : no focal deficits [Coordination Grossly Intact] : coordination grossly intact [Grossly Normal Strength/Tone] : grossly normal strength/tone [Normal Gait] : normal gait [Normal Affect] : the affect was normal [Normal Insight/Judgement] : insight and judgment were intact [de-identified] : Small vesicular lesion over upper labia  [de-identified] : Small, scattered vesiuclar lesions wihout crusting over R upper shoulder  [de-identified] : Tachycardic [de-identified] : 1x1 cm lesion with serosanguinous drainage along R Lateral ankle

## 2020-07-21 NOTE — END OF VISIT
[Time Spent: ___ minutes] : I have spent [unfilled] minutes of time on the encounter. [FreeTextEntry3] : I saw the patinet for first time few weeks ago while hospitalized with R lower extrimity MRSA infection. Was off ARVs and I started him on Biktarvy.\par \par Here at [facility] for first time.\par Left shoulder rash without pain or itching\par Chance of Zoster low\par \par maintenance valterex for hsitory of recurrent herpse\par \par Telehealth video appointment in one week to see the development of the rash\par \par RTC also in 1 month in person\par anal pap and 3 site today [] : Resident

## 2020-07-21 NOTE — ASSESSMENT
[FreeTextEntry1] : Mr Jose Antonio is a 46 yo MSM with HIV who presents to establish care after recent hospital admission for management of MRSA cellulitis. \par \par #AIDS\par Diagnosed in 2005, has been off ARVs since 2018, recently started on Biktarvy prior to DC from [facility] 7/16/2020\par - Reports full compliance with medication, no AEs noted \par - VL 66K and CD4 103\par - Triple site and anal pap smear today \par - Patient reports history of treated syphillis in past (2017); RPR today \par - Quantiferon gold and hepatitis panel today \par - c/w biktarvy and atovqoune for PCP PPX \par \par # History of Genital and Oral Herpes\par Patient reports history of genital and oral herpes, with PRN usage of Valtrex in the past \par - Patient with eruption over upper labia and vesicular eruption on R shoulder blade (not classic zoster given appearance and lack of pain)\par - to take valtrex 500 mg daily \par - Telehealth in 1 week to evaluate vesicular eruption on back \par \par #Vesicular eruption of back \par Patient with vesicuarl eruption of back, unilateral (R side) in dermatomal pattern (C3-4) \par - Not classic for zoster given generally spread out distribution and lack of pain \par - No features of secondary syphillis given lack of hand/feet findings and other general skin findings\par - f/r RPR; c/w valtrex as above\par - telehealth in 1 wk to re-evaluate; possible immune reconstitution given restarted ARVs \par \par #MRSA Cellulitis of RLE\par Patient with positive MRSA wound culture at urgent care and recent admission at [facility] for management of MRSA cellulitis \par - Continuing outpatient course of BID Doxycycline (10 day total course) \par - RLE symmetric to LLE without any erythema or area of induration; small ulceration draining serosanguinous fluid without lo pus \par - c/w Doxycycline for full 10 day course \par \par #HCM\par - Holding on TDaP while CD4 still recovering \par - Psychiatry referral \par - RTC in 1 week for telealth visit

## 2020-07-22 LAB
4/8 RATIO: 0.12 RATIO — LOW (ref 0.9–3.6)
ABS CD8: 892 /UL — HIGH (ref 142–740)
C TRACH RRNA SPEC QL NAA+PROBE: SIGNIFICANT CHANGE UP
C TRACH+GC RRNA ANAL QL PROBE: SIGNIFICANT CHANGE UP
C TRACH+GC RRNA SPEC QL PROBE: SIGNIFICANT CHANGE UP
CD3 BLASTS SPEC-ACNC: 1025 /UL — SIGNIFICANT CHANGE UP (ref 672–1870)
CD3 BLASTS SPEC-ACNC: 85 % — HIGH (ref 59–83)
CD4 %: 9 % — LOW (ref 30–62)
CD8 %: 74 % — HIGH (ref 12–36)
CHLAMYDIA/N. GONORRHEA, ANAL/RECTAL, TMA - SOURCE ANAL: SIGNIFICANT CHANGE UP
CHLAMYDIA/N. GONORRHEA, ORAL/THROAT, TMA - SOURCE ORAL: SIGNIFICANT CHANGE UP
HAV IGG SER QL IA: SIGNIFICANT CHANGE UP
N GONORRHOEA RRNA SPEC QL NAA+PROBE: SIGNIFICANT CHANGE UP
SPECIMEN SOURCE: SIGNIFICANT CHANGE UP
T PALLIDUM AB TITR SER: POSITIVE
T-CELL CD4 SUBSET PNL BLD: 104 /UL — LOW (ref 489–1457)

## 2020-07-23 DIAGNOSIS — B20 HUMAN IMMUNODEFICIENCY VIRUS [HIV] DISEASE: ICD-10-CM

## 2020-07-23 DIAGNOSIS — Z72.52 HIGH RISK HOMOSEXUAL BEHAVIOR: ICD-10-CM

## 2020-07-23 DIAGNOSIS — Z72.0 TOBACCO USE: ICD-10-CM

## 2020-07-23 DIAGNOSIS — F14.90 COCAINE USE, UNSPECIFIED, UNCOMPLICATED: ICD-10-CM

## 2020-07-23 DIAGNOSIS — L03.90 CELLULITIS, UNSPECIFIED: ICD-10-CM

## 2020-07-23 DIAGNOSIS — B00.9 HERPESVIRAL INFECTION, UNSPECIFIED: ICD-10-CM

## 2020-07-23 DIAGNOSIS — Z00.00 ENCOUNTER FOR GENERAL ADULT MEDICAL EXAMINATION WITHOUT ABNORMAL FINDINGS: ICD-10-CM

## 2020-07-23 DIAGNOSIS — B95.62 METHICILLIN RESISTANT STAPHYLOCOCCUS AUREUS INFECTION AS THE CAUSE OF DISEASES CLASSIFIED ELSEWHERE: ICD-10-CM

## 2020-07-23 DIAGNOSIS — Z86.19 PERSONAL HISTORY OF OTHER INFECTIOUS AND PARASITIC DISEASES: ICD-10-CM

## 2020-07-23 LAB
GAMMA INTERFERON BACKGROUND BLD IA-ACNC: 0.04 IU/ML — SIGNIFICANT CHANGE UP
M TB IFN-G BLD-IMP: NEGATIVE — SIGNIFICANT CHANGE UP
M TB IFN-G CD4+ BCKGRND COR BLD-ACNC: 0 IU/ML — SIGNIFICANT CHANGE UP
M TB IFN-G CD4+CD8+ BCKGRND COR BLD-ACNC: -0.01 IU/ML — SIGNIFICANT CHANGE UP
QUANT TB PLUS MITOGEN MINUS NIL: 4.86 IU/ML — SIGNIFICANT CHANGE UP

## 2020-07-25 LAB — NON-GYNECOLOGICAL CYTOLOGY STUDY: SIGNIFICANT CHANGE UP

## 2020-07-26 LAB
BICTEGRAVIR RESISTANCE: SIGNIFICANT CHANGE UP
DOLUTEGRAVIR ISLT GENOTYP: SIGNIFICANT CHANGE UP
ELVITEGRAVIR ISLT GENOTYP: SIGNIFICANT CHANGE UP
RALTEGRAVIR ISLT GENOTYP: SIGNIFICANT CHANGE UP
VIRAL LOAD DATE: SIGNIFICANT CHANGE UP

## 2020-07-27 DIAGNOSIS — Z59.7 INSUFFICIENT SOCIAL INSURANCE AND WELFARE SUPPORT: ICD-10-CM

## 2020-07-27 DIAGNOSIS — B95.62 METHICILLIN RESISTANT STAPHYLOCOCCUS AUREUS INFECTION AS THE CAUSE OF DISEASES CLASSIFIED ELSEWHERE: ICD-10-CM

## 2020-07-27 SDOH — ECONOMIC STABILITY - INCOME SECURITY: INSUFFICIENT SOCIAL INSURANCE AND WELFARE SUPPORT: Z59.7

## 2020-07-28 ENCOUNTER — APPOINTMENT (OUTPATIENT)
Dept: INFECTIOUS DISEASE | Facility: CLINIC | Age: 45
End: 2020-07-28

## 2020-07-29 LAB — HPV DNA HIGH-RISK, ANAL/RECTAL: DETECTED

## 2020-07-30 ENCOUNTER — TRANSCRIPTION ENCOUNTER (OUTPATIENT)
Age: 45
End: 2020-07-30

## 2020-08-03 LAB
HIV1 RNA # SERPL NAA+PROBE: 341 — SIGNIFICANT CHANGE UP
HIV1 RNA SERPL NAA+PROBE-LOG#: 2.53 — HIGH

## 2020-08-11 ENCOUNTER — APPOINTMENT (OUTPATIENT)
Dept: INFECTIOUS DISEASE | Facility: CLINIC | Age: 45
End: 2020-08-11

## 2020-08-11 DIAGNOSIS — Z00.00 ENCOUNTER FOR GENERAL ADULT MEDICAL EXAMINATION W/OUT ABNORMAL FINDINGS: ICD-10-CM

## 2020-08-11 DIAGNOSIS — B95.62 CELLULITIS, UNSPECIFIED: ICD-10-CM

## 2020-08-11 DIAGNOSIS — B20 HUMAN IMMUNODEFICIENCY VIRUS [HIV] DISEASE: ICD-10-CM

## 2020-08-11 DIAGNOSIS — L03.90 CELLULITIS, UNSPECIFIED: ICD-10-CM

## 2020-08-11 RX ORDER — VALACYCLOVIR 500 MG/1
500 TABLET, FILM COATED ORAL DAILY
Qty: 30 | Refills: 2 | Status: ACTIVE | COMMUNITY
Start: 2020-07-21 | End: 1900-01-01

## 2020-08-11 RX ORDER — ALPRAZOLAM 0.5 MG/1
0.5 TABLET ORAL
Qty: 3 | Refills: 0 | Status: ACTIVE | COMMUNITY
Start: 2020-08-11 | End: 1900-01-01

## 2020-08-13 ENCOUNTER — APPOINTMENT (OUTPATIENT)
Dept: INFECTIOUS DISEASE | Facility: CLINIC | Age: 45
End: 2020-08-13

## 2020-08-27 ENCOUNTER — APPOINTMENT (OUTPATIENT)
Dept: INFECTIOUS DISEASE | Facility: CLINIC | Age: 45
End: 2020-08-27
Payer: MEDICAID

## 2020-08-27 ENCOUNTER — OUTPATIENT (OUTPATIENT)
Dept: OUTPATIENT SERVICES | Facility: HOSPITAL | Age: 45
LOS: 1 days | End: 2020-08-27

## 2020-08-27 VITALS
HEART RATE: 90 BPM | BODY MASS INDEX: 22.42 KG/M2 | RESPIRATION RATE: 12 BRPM | TEMPERATURE: 98.3 F | SYSTOLIC BLOOD PRESSURE: 106 MMHG | DIASTOLIC BLOOD PRESSURE: 66 MMHG | WEIGHT: 165.5 LBS | OXYGEN SATURATION: 98 % | HEIGHT: 72 IN

## 2020-08-27 DIAGNOSIS — B00.9 HERPESVIRAL INFECTION, UNSPECIFIED: ICD-10-CM

## 2020-08-27 DIAGNOSIS — R21 RASH AND OTHER NONSPECIFIC SKIN ERUPTION: ICD-10-CM

## 2020-08-27 LAB
ALBUMIN SERPL ELPH-MCNC: 4.5 G/DL — SIGNIFICANT CHANGE UP (ref 3.3–5)
ALP SERPL-CCNC: 45 U/L — SIGNIFICANT CHANGE UP (ref 40–120)
ALT FLD-CCNC: 21 U/L — SIGNIFICANT CHANGE UP (ref 10–45)
ANION GAP SERPL CALC-SCNC: 12 MMOL/L — SIGNIFICANT CHANGE UP (ref 5–17)
AST SERPL-CCNC: 22 U/L — SIGNIFICANT CHANGE UP (ref 10–40)
BASOPHILS # BLD AUTO: 0.07 K/UL — SIGNIFICANT CHANGE UP (ref 0–0.2)
BASOPHILS NFR BLD AUTO: 1.6 % — SIGNIFICANT CHANGE UP (ref 0–2)
BILIRUB SERPL-MCNC: 0.4 MG/DL — SIGNIFICANT CHANGE UP (ref 0.2–1.2)
BUN SERPL-MCNC: 16 MG/DL — SIGNIFICANT CHANGE UP (ref 7–23)
CALCIUM SERPL-MCNC: 9 MG/DL — SIGNIFICANT CHANGE UP (ref 8.4–10.5)
CHLORIDE SERPL-SCNC: 104 MMOL/L — SIGNIFICANT CHANGE UP (ref 96–108)
CO2 SERPL-SCNC: 24 MMOL/L — SIGNIFICANT CHANGE UP (ref 22–31)
CREAT SERPL-MCNC: 1.12 MG/DL — SIGNIFICANT CHANGE UP (ref 0.5–1.3)
EOSINOPHIL # BLD AUTO: 0.27 K/UL — SIGNIFICANT CHANGE UP (ref 0–0.5)
EOSINOPHIL NFR BLD AUTO: 6.4 % — HIGH (ref 0–6)
GLUCOSE SERPL-MCNC: 89 MG/DL — SIGNIFICANT CHANGE UP (ref 70–99)
HCT VFR BLD CALC: 42.8 % — SIGNIFICANT CHANGE UP (ref 39–50)
HGB BLD-MCNC: 14 G/DL — SIGNIFICANT CHANGE UP (ref 13–17)
IMM GRANULOCYTES NFR BLD AUTO: 0.2 % — SIGNIFICANT CHANGE UP (ref 0–1.5)
LYMPHOCYTES # BLD AUTO: 1.27 K/UL — SIGNIFICANT CHANGE UP (ref 1–3.3)
LYMPHOCYTES # BLD AUTO: 29.9 % — SIGNIFICANT CHANGE UP (ref 13–44)
MCHC RBC-ENTMCNC: 29.5 PG — SIGNIFICANT CHANGE UP (ref 27–34)
MCHC RBC-ENTMCNC: 32.7 GM/DL — SIGNIFICANT CHANGE UP (ref 32–36)
MCV RBC AUTO: 90.1 FL — SIGNIFICANT CHANGE UP (ref 80–100)
MONOCYTES # BLD AUTO: 0.35 K/UL — SIGNIFICANT CHANGE UP (ref 0–0.9)
MONOCYTES NFR BLD AUTO: 8.2 % — SIGNIFICANT CHANGE UP (ref 2–14)
NEUTROPHILS # BLD AUTO: 2.28 K/UL — SIGNIFICANT CHANGE UP (ref 1.8–7.4)
NEUTROPHILS NFR BLD AUTO: 53.7 % — SIGNIFICANT CHANGE UP (ref 43–77)
NRBC # BLD: 0 /100 WBCS — SIGNIFICANT CHANGE UP (ref 0–0)
PLATELET # BLD AUTO: 344 K/UL — SIGNIFICANT CHANGE UP (ref 150–400)
POTASSIUM SERPL-MCNC: 4.4 MMOL/L — SIGNIFICANT CHANGE UP (ref 3.5–5.3)
POTASSIUM SERPL-SCNC: 4.4 MMOL/L — SIGNIFICANT CHANGE UP (ref 3.5–5.3)
PROT SERPL-MCNC: 8.9 G/DL — HIGH (ref 6–8.3)
RBC # BLD: 4.75 M/UL — SIGNIFICANT CHANGE UP (ref 4.2–5.8)
RBC # FLD: 13.8 % — SIGNIFICANT CHANGE UP (ref 10.3–14.5)
SODIUM SERPL-SCNC: 140 MMOL/L — SIGNIFICANT CHANGE UP (ref 135–145)
WBC # BLD: 4.25 K/UL — SIGNIFICANT CHANGE UP (ref 3.8–10.5)
WBC # FLD AUTO: 4.25 K/UL — SIGNIFICANT CHANGE UP (ref 3.8–10.5)

## 2020-08-27 PROCEDURE — 99214 OFFICE O/P EST MOD 30 MIN: CPT | Mod: GC

## 2020-08-27 RX ORDER — BICTEGRAVIR SODIUM, EMTRICITABINE, AND TENOFOVIR ALAFENAMIDE FUMARATE 50; 200; 25 MG/1; MG/1; MG/1
50-200-25 TABLET ORAL
Refills: 0 | Status: DISCONTINUED | COMMUNITY
End: 2020-08-27

## 2020-08-27 RX ORDER — KETOCONAZOLE 20 MG/G
2 CREAM TOPICAL TWICE DAILY
Qty: 30 | Refills: 1 | Status: ACTIVE | COMMUNITY
Start: 2020-08-27 | End: 1900-01-01

## 2020-08-27 RX ORDER — EMTRICITABINE AND TENOFOVIR ALAFENAMIDE 200; 25 MG/1; MG/1
200-25 TABLET ORAL DAILY
Qty: 30 | Refills: 2 | Status: ACTIVE | COMMUNITY
Start: 2020-08-27 | End: 1900-01-01

## 2020-08-28 LAB
4/8 RATIO: 0.17 RATIO — LOW (ref 0.9–3.6)
ABS CD8: 866 /UL — HIGH (ref 142–740)
CD3 BLASTS SPEC-ACNC: 1051 /UL — SIGNIFICANT CHANGE UP (ref 672–1870)
CD3 BLASTS SPEC-ACNC: 86 % — HIGH (ref 59–83)
CD4 %: 12 % — LOW (ref 30–62)
CD8 %: 71 % — HIGH (ref 12–36)
T-CELL CD4 SUBSET PNL BLD: 146 /UL — LOW (ref 489–1457)

## 2020-08-28 NOTE — END OF VISIT
[] : Resident [FreeTextEntry3] : RPR 1;128 with no other RPR available\par Will treat as late\par History of panic with PNC injection in past\par Pre-ttreat each with 0.5 Xanax\par One dose given today\par \par Skin rash is pruritic, most possibly not syphilis\par Change from Biktarvy to Tivicay as believes rash started after ARVs\par Will FU\par RTC in 1 week [Time Spent: ___ minutes] : I have spent [unfilled] minutes of time on the encounter.

## 2020-08-28 NOTE — PHYSICAL EXAM
[Normal] : affect was normal and insight and judgment were intact [de-identified] : papular rash noted on upper chest, scalp and upper neck. 1x1 cm healing follicile on right buttock

## 2020-08-28 NOTE — REVIEW OF SYSTEMS
[Skin Rash] : skin rash [Negative] : Heme/Lymph [de-identified] : papular rash on chest, dandruff noted, with scaling dry skin on upper back

## 2020-08-28 NOTE — ASSESSMENT
[FreeTextEntry1] : #HIV\par -patient with viral load of 341 and CD4 of 104 (07/20). Patient was off therapy for sometime due to insurance issues and resumed tx with biktarvy and atovaquone on 7/11.\par -patient notes that he had a rash on his chest start that is quite itchy shortly after starting Biktarv. Itching points more towards drug rash vs syphillus.  Of note patient had allergy to Truvada that was listed, however on further questioning rash more likely due to Efaverenz (component of Atripla, medication patient was originally on years ago which gave him hives breakout)\par -D/C Biktarvy\par -started on descovy and tivicay. continue with atovaquone \par -HIV labs today\par \par #Skin rash\par -rash is pustular and itchy predominantly on upper chest, scalp and upper neck line. Patient states rash started few days after he started Biktarvy in July. Rash has been waxing and waning. No overt drainage. Rash likely 2/2 drug reaction vs Late syphillus. Dissemenated HSV unlikely as patient with no pain or vesicular outbreaks\par -D/C Biktarvy and started on Descovy and Tivivcay\par -patient with positive RPR 1:128 7/20-->given 2.4 million units Penicillin today\par \par #syphilis\par -RPR 1:128. Last tx'd for syphilis in 2017. Last screening for syphilis was at [facility]\par  approximately 1 year ago per pt which he reports was negative. 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 transitioned to acyclovir 500 mg QD recently which has been helping. Patient denies any outbreaks for the past two weeks, which he reports hasn’t happened in quite sometime\par -c/w acyclovir\par -continue to monitor for outbreaks

## 2020-08-28 NOTE — HISTORY OF PRESENT ILLNESS
[de-identified] : Mr. LADAN MICHELLE is a 45 year old male with AIDS (diagnosed 2005 with HIV, on ARVs in 2016 before discontinuing in 2018 due to insurance gaps; restarted on Biktarvy ~July 14th and started on atovaquone ppx, most recent viral load 341, CD4 104 07/20), recurrent HSV on suppressive tx, and recurrent MRSA cellulitis (recently treated in July s/p I and D and IV Abx) who presents for f/up. rash started after takign Biktarvy. rash is itchy and not painful. rash is predominantly in upper chest, scalp and neck line. Rash is not on palms and soles. Denies any discharge from lesions\par        Patient reports he had large swollen pimple on the right butt check, which he was able to pop and express pus (08/17) and patient states it has improved since. No fever, chills, nausea, vomiting, CP, SOB, dizziness, falls, diarrhea/ constipation.\par        Patient reports he has been with two partners in the last two months. Patient denies using condoms, states it irritates his skin. Alternates positions. Patient reports he has had frequent herpes this year (3 times on face most recent 2 weeks ago in nose, genital breakouts 4 times). Patient has been taking acylcovir once a day for a month with no breakouts the past 2 weeks. Missed two doses of biktarvy and atovaquone this past month. \par \par Social Hx-smokes pack per week, drinks rarely, few weeks smoked crystal meth,

## 2020-08-29 LAB
HIV-1 VIRAL LOAD RESULT: SIGNIFICANT CHANGE UP
HIV1 RNA # SERPL NAA+PROBE: SIGNIFICANT CHANGE UP
HIV1 RNA SER-IMP: SIGNIFICANT CHANGE UP
HIV1 RNA SERPL NAA+PROBE-ACNC: SIGNIFICANT CHANGE UP
HIV1 RNA SERPL NAA+PROBE-LOG#: SIGNIFICANT CHANGE UP LG COP/ML

## 2020-08-31 ENCOUNTER — TRANSCRIPTION ENCOUNTER (OUTPATIENT)
Age: 45
End: 2020-08-31

## 2020-08-31 DIAGNOSIS — L21.9 SEBORRHEIC DERMATITIS, UNSPECIFIED: ICD-10-CM

## 2020-08-31 DIAGNOSIS — B20 HUMAN IMMUNODEFICIENCY VIRUS [HIV] DISEASE: ICD-10-CM

## 2020-08-31 DIAGNOSIS — B00.9 HERPESVIRAL INFECTION, UNSPECIFIED: ICD-10-CM

## 2020-08-31 DIAGNOSIS — A52.9 LATE SYPHILIS, UNSPECIFIED: ICD-10-CM

## 2020-08-31 DIAGNOSIS — R21 RASH AND OTHER NONSPECIFIC SKIN ERUPTION: ICD-10-CM

## 2020-09-02 LAB — HLA-B*57:01 SPEC QL: NEGATIVE — SIGNIFICANT CHANGE UP

## 2020-09-03 ENCOUNTER — APPOINTMENT (OUTPATIENT)
Dept: INFECTIOUS DISEASE | Facility: CLINIC | Age: 45
End: 2020-09-03
Payer: MEDICAID

## 2020-09-03 ENCOUNTER — OUTPATIENT (OUTPATIENT)
Dept: OUTPATIENT SERVICES | Facility: HOSPITAL | Age: 45
LOS: 1 days | End: 2020-09-03

## 2020-09-03 VITALS
DIASTOLIC BLOOD PRESSURE: 70 MMHG | WEIGHT: 166 LBS | HEART RATE: 70 BPM | RESPIRATION RATE: 12 BRPM | OXYGEN SATURATION: 98 % | BODY MASS INDEX: 22.48 KG/M2 | SYSTOLIC BLOOD PRESSURE: 106 MMHG | HEIGHT: 72 IN | TEMPERATURE: 96.8 F

## 2020-09-03 DIAGNOSIS — L21.9 SEBORRHEIC DERMATITIS, UNSPECIFIED: ICD-10-CM

## 2020-09-03 DIAGNOSIS — Z86.19 PERSONAL HISTORY OF OTHER INFECTIOUS AND PARASITIC DISEASES: ICD-10-CM

## 2020-09-03 DIAGNOSIS — F41.8 OTHER SPECIFIED ANXIETY DISORDERS: ICD-10-CM

## 2020-09-03 PROCEDURE — 99213 OFFICE O/P EST LOW 20 MIN: CPT | Mod: GC

## 2020-09-03 NOTE — PHYSICAL EXAM
[Well Developed] : well developed [No Acute Distress] : no acute distress [Well Nourished] : well nourished [PERRL] : pupils equal round and reactive to light [Normal Sclera/Conjunctiva] : normal sclera/conjunctiva [Well-Appearing] : well-appearing [Normal Outer Ear/Nose] : the outer ears and nose were normal in appearance [EOMI] : extraocular movements intact [No JVD] : no jugular venous distention [No Lymphadenopathy] : no lymphadenopathy [Normal Oropharynx] : the oropharynx was normal [Supple] : supple [No Respiratory Distress] : no respiratory distress  [Thyroid Normal, No Nodules] : the thyroid was normal and there were no nodules present [Normal Rate] : normal rate  [No Accessory Muscle Use] : no accessory muscle use [Clear to Auscultation] : lungs were clear to auscultation bilaterally [Regular Rhythm] : with a regular rhythm [No Murmur] : no murmur heard [Normal S1, S2] : normal S1 and S2 [No Carotid Bruits] : no carotid bruits [No Varicosities] : no varicosities [No Abdominal Bruit] : a ~M bruit was not heard ~T in the abdomen [Pedal Pulses Present] : the pedal pulses are present [No Edema] : there was no peripheral edema [No Extremity Clubbing/Cyanosis] : no extremity clubbing/cyanosis [Soft] : abdomen soft [No Palpable Aorta] : no palpable aorta [Non-distended] : non-distended [Non Tender] : non-tender [No Masses] : no abdominal mass palpated [No HSM] : no HSM [Normal Bowel Sounds] : normal bowel sounds [Normal Posterior Cervical Nodes] : no posterior cervical lymphadenopathy [No CVA Tenderness] : no CVA  tenderness [Normal Anterior Cervical Nodes] : no anterior cervical lymphadenopathy [No Joint Swelling] : no joint swelling [No Spinal Tenderness] : no spinal tenderness [Coordination Grossly Intact] : coordination grossly intact [No Rash] : no rash [Grossly Normal Strength/Tone] : grossly normal strength/tone [No Focal Deficits] : no focal deficits [Normal Gait] : normal gait [Normal Affect] : the affect was normal [Normal Insight/Judgement] : insight and judgment were intact

## 2020-09-04 RX ORDER — ATOVAQUONE 750 MG/5ML
SUSPENSION ORAL
Refills: 0 | Status: DISCONTINUED | COMMUNITY
End: 2020-09-04
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
## 2020-09-08 RX ORDER — ATOVAQUONE 750 MG/5ML
750 SUSPENSION ORAL TWICE DAILY
Qty: 300 | Refills: 2 | Status: ACTIVE | COMMUNITY
Start: 2020-09-03

## 2020-09-09 ENCOUNTER — APPOINTMENT (OUTPATIENT)
Dept: INFECTIOUS DISEASE | Facility: CLINIC | Age: 45
End: 2020-09-09

## 2020-09-09 ENCOUNTER — APPOINTMENT (OUTPATIENT)
Dept: PSYCHIATRY | Facility: CLINIC | Age: 45
End: 2020-09-09
Payer: MEDICAID

## 2020-09-09 VITALS — SYSTOLIC BLOOD PRESSURE: 120 MMHG | DIASTOLIC BLOOD PRESSURE: 80 MMHG

## 2020-09-09 PROCEDURE — 90792 PSYCH DIAG EVAL W/MED SRVCS: CPT | Mod: 95

## 2020-09-10 ENCOUNTER — OUTPATIENT (OUTPATIENT)
Dept: OUTPATIENT SERVICES | Facility: HOSPITAL | Age: 45
LOS: 1 days | End: 2020-09-10

## 2020-09-10 ENCOUNTER — APPOINTMENT (OUTPATIENT)
Dept: INFECTIOUS DISEASE | Facility: CLINIC | Age: 45
End: 2020-09-10

## 2020-09-10 DIAGNOSIS — A52.9 LATE SYPHILIS, UNSPECIFIED: ICD-10-CM

## 2020-09-10 DIAGNOSIS — B20 HUMAN IMMUNODEFICIENCY VIRUS [HIV] DISEASE: ICD-10-CM

## 2020-09-10 RX ORDER — PENICILLIN G BENZATHINE 2400000 [IU]/4ML
2400000 INJECTION, SUSPENSION INTRAMUSCULAR
Qty: 1 | Refills: 0 | Status: COMPLETED | OUTPATIENT
Start: 2020-09-10

## 2020-09-10 RX ADMIN — PENICILLIN G BENZATHINE 4 UNIT/4ML: 2400000 INJECTION, SUSPENSION INTRAMUSCULAR at 00:00

## 2020-09-11 ENCOUNTER — OUTPATIENT (OUTPATIENT)
Dept: OUTPATIENT SERVICES | Facility: HOSPITAL | Age: 45
LOS: 1 days | End: 2020-09-11

## 2020-09-14 DIAGNOSIS — F41.8 OTHER SPECIFIED ANXIETY DISORDERS: ICD-10-CM

## 2020-09-14 DIAGNOSIS — L21.9 SEBORRHEIC DERMATITIS, UNSPECIFIED: ICD-10-CM

## 2020-09-14 DIAGNOSIS — A52.9 LATE SYPHILIS, UNSPECIFIED: ICD-10-CM

## 2020-09-14 DIAGNOSIS — B20 HUMAN IMMUNODEFICIENCY VIRUS [HIV] DISEASE: ICD-10-CM

## 2020-09-15 DIAGNOSIS — B20 HUMAN IMMUNODEFICIENCY VIRUS [HIV] DISEASE: ICD-10-CM

## 2020-09-15 DIAGNOSIS — A52.9 LATE SYPHILIS, UNSPECIFIED: ICD-10-CM

## 2020-10-08 RX ORDER — DOLUTEGRAVIR SODIUM 50 MG/1
50 TABLET, FILM COATED ORAL DAILY
Qty: 30 | Refills: 1 | Status: ACTIVE | COMMUNITY
Start: 2020-08-27

## 2021-03-16 ENCOUNTER — NON-APPOINTMENT (OUTPATIENT)
Age: 46
End: 2021-03-16

## 2021-06-11 ENCOUNTER — NON-APPOINTMENT (OUTPATIENT)
Age: 46
End: 2021-06-11

## 2021-11-09 ENCOUNTER — NON-APPOINTMENT (OUTPATIENT)
Age: 46
End: 2021-11-09

## 2022-02-02 ENCOUNTER — NON-APPOINTMENT (OUTPATIENT)
Age: 47
End: 2022-02-02

## 2023-09-26 NOTE — PATIENT PROFILE ADULT - NSPROMUTANXFEARFT_GEN_A_NUR
Medicare Preventive Visit Patient Instructions  Thank you for completing your Welcome to Medicare Visit or Medicare Annual Wellness Visit today. Your next wellness visit will be due in one year (8/2/2024). The screening/preventive services that you may require over the next 5-10 years are detailed below. Some tests may not apply to you based off risk factors and/or age. Screening tests ordered at today's visit but not completed yet may show as past due. Also, please note that scanned in results may not display below. Preventive Screenings:  Service Recommendations Previous Testing/Comments   Colorectal Cancer Screening  · Colonoscopy    · Fecal Occult Blood Test (FOBT)/Fecal Immunochemical Test (FIT)  · Fecal DNA/Cologuard Test  · Flexible Sigmoidoscopy Age: 43-73 years old   Colonoscopy: every 10 years (May be performed more frequently if at higher risk)  OR  FOBT/FIT: every 1 year  OR  Cologuard: every 3 years  OR  Sigmoidoscopy: every 5 years  Screening may be recommended earlier than age 39 if at higher risk for colorectal cancer. Also, an individualized decision between you and your healthcare provider will decide whether screening between the ages of 77-80 would be appropriate.  Colonoscopy: 12/21/2021  FOBT/FIT: Not on file  Cologuard: Not on file  Sigmoidoscopy: Not on file          Prostate Cancer Screening Individualized decision between patient and health care provider in men between ages of 53-66   Medicare will cover every 12 months beginning on the day after your 50th birthday PSA: 1.7 ng/mL     Screening Not Indicated     Hepatitis C Screening Once for adults born between 1945 and 1965  More frequently in patients at high risk for Hepatitis C Hep C Antibody: 09/24/2021    Screening Current   Diabetes Screening 1-2 times per year if you're at risk for diabetes or have pre-diabetes Fasting glucose: 121 mg/dL (7/7/2023)  A1C: 6.3 % (7/7/2023)  Screening Not Indicated  History Diabetes   Cholesterol Screening Once every 5 years if you don't have a lipid disorder. May order more often based on risk factors. Lipid panel: 05/05/2022  Screening Not Indicated  History Lipid Disorder      Other Preventive Screenings Covered by Medicare:  1. Abdominal Aortic Aneurysm (AAA) Screening: covered once if your at risk. You're considered to be at risk if you have a family history of AAA or a male between the age of 70-76 who smoking at least 100 cigarettes in your lifetime. 2. Lung Cancer Screening: covers low dose CT scan once per year if you meet all of the following conditions: (1) Age 48-67; (2) No signs or symptoms of lung cancer; (3) Current smoker or have quit smoking within the last 15 years; (4) You have a tobacco smoking history of at least 20 pack years (packs per day x number of years you smoked); (5) You get a written order from a healthcare provider. 3. Glaucoma Screening: covered annually if you're considered high risk: (1) You have diabetes OR (2) Family history of glaucoma OR (3)  aged 48 and older OR (3)  American aged 72 and older  3. Osteoporosis Screening: covered every 2 years if you meet one of the following conditions: (1) Have a vertebral abnormality; (2) On glucocorticoid therapy for more than 3 months; (3) Have primary hyperparathyroidism; (4) On osteoporosis medications and need to assess response to drug therapy. 5. HIV Screening: covered annually if you're between the age of 14-79. Also covered annually if you are younger than 13 and older than 72 with risk factors for HIV infection. For pregnant patients, it is covered up to 3 times per pregnancy.     Immunizations:  Immunization Recommendations   Influenza Vaccine Annual influenza vaccination during flu season is recommended for all persons aged >= 6 months who do not have contraindications   Pneumococcal Vaccine   * Pneumococcal conjugate vaccine = PCV13 (Prevnar 13), PCV15 (Vaxneuvance), PCV20 (Prevnar 20)  * Pneumococcal polysaccharide vaccine = PPSV23 (Pneumovax) Adults 2364 years old: 1-3 doses may be recommended based on certain risk factors  Adults 72 years old: 1-2 doses may be recommended based off what pneumonia vaccine you previously received   Hepatitis B Vaccine 3 dose series if at intermediate or high risk (ex: diabetes, end stage renal disease, liver disease)   Tetanus (Td) Vaccine - COST NOT COVERED BY MEDICARE PART B Following completion of primary series, a booster dose should be given every 10 years to maintain immunity against tetanus. Td may also be given as tetanus wound prophylaxis. Tdap Vaccine - COST NOT COVERED BY MEDICARE PART B Recommended at least once for all adults. For pregnant patients, recommended with each pregnancy. Shingles Vaccine (Shingrix) - COST NOT COVERED BY MEDICARE PART B  2 shot series recommended in those aged 48 and above     Health Maintenance Due:      Topic Date Due   • Hepatitis C Screening  Completed   • Lung Cancer Screening  Discontinued     Immunizations Due:      Topic Date Due   • Hepatitis A Vaccine (1 of 2 - Risk 2-dose series) Never done   • Hepatitis B Vaccine (1 of 3 - Risk 3-dose series) Never done   • COVID-19 Vaccine (5 - Pfizer series) 01/29/2023   • Influenza Vaccine (1) 09/01/2023     Advance Directives   What are advance directives? Advance directives are legal documents that state your wishes and plans for medical care. These plans are made ahead of time in case you lose your ability to make decisions for yourself. Advance directives can apply to any medical decision, such as the treatments you want, and if you want to donate organs. What are the types of advance directives? There are many types of advance directives, and each state has rules about how to use them. You may choose a combination of any of the following:  · Living will: This is a written record of the treatment you want.  You can also choose which treatments you do not want, which to limit, and which to stop at a certain time. This includes surgery, medicine, IV fluid, and tube feedings. · Durable power of  for [facility]): This is a written record that states who you want to make healthcare choices for you when you are unable to make them for yourself. This person, called a proxy, is usually a family member or a friend. You may choose more than 1 proxy. · Do not resuscitate (DNR) order:  A DNR order is used in case your heart stops beating or you stop breathing. It is a request not to have certain forms of treatment, such as CPR. A DNR order may be included in other types of advance directives. · Medical directive: This covers the care that you want if you are in a coma, near death, or unable to make decisions for yourself. You can list the treatments you want for each condition. Treatment may include pain medicine, surgery, blood transfusions, dialysis, IV or tube feedings, and a ventilator (breathing machine). · Values history: This document has questions about your views, beliefs, and how you feel and think about life. This information can help others choose the care that you would choose. Why are advance directives important? An advance directive helps you control your care. Although spoken wishes may be used, it is better to have your wishes written down. Spoken wishes can be misunderstood, or not followed. Treatments may be given even if you do not want them. An advance directive may make it easier for your family to make difficult choices about your care. Cigarette Smoking and Your Health   Risks to your health if you smoke:  Nicotine and other chemicals found in tobacco damage every cell in your body. Even if you are a light smoker, you have an increased risk for cancer, heart disease, and lung disease. If you are pregnant or have diabetes, smoking increases your risk for complications.    Benefits to your health if you stop smoking:   · You decrease respiratory symptoms such as coughing, wheezing, and shortness of breath. · You reduce your risk for cancers of the lung, mouth, throat, kidney, bladder, pancreas, stomach, and cervix. If you already have cancer, you increase the benefits of chemotherapy. You also reduce your risk for cancer returning or a second cancer from developing. · You reduce your risk for heart disease, blood clots, heart attack, and stroke. · You reduce your risk for lung infections, and diseases such as pneumonia, asthma, chronic bronchitis, and emphysema. · Your circulation improves. More oxygen can be delivered to your body. If you have diabetes, you lower your risk for complications, such as kidney, artery, and eye diseases. You also lower your risk for nerve damage. Nerve damage can lead to amputations, poor vision, and blindness. · You improve your body's ability to heal and to fight infections. For more information and support to stop smoking:   · [employer]  Phone: 7- 764 - [phone]  Web Address: www.[employer]  Weight Management   Why it is important to manage your weight:  Being overweight increases your risk of health conditions such as heart disease, high blood pressure, type 2 diabetes, and certain types of cancer. It can also increase your risk for osteoarthritis, sleep apnea, and other respiratory problems. Aim for a slow, steady weight loss. Even a small amount of weight loss can lower your risk of health problems. How to lose weight safely:  A safe and healthy way to lose weight is to eat fewer calories and get regular exercise. You can lose up about 1 pound a week by decreasing the number of calories you eat by 500 calories each day. Healthy meal plan for weight management:  A healthy meal plan includes a variety of foods, contains fewer calories, and helps you stay healthy. A healthy meal plan includes the following:  · Eat whole-grain foods more often. A healthy meal plan should contain fiber.  Fiber is the part of grains, fruits, and vegetables that is not broken down by your body. Whole-grain foods are healthy and provide extra fiber in your diet. Some examples of whole-grain foods are whole-wheat breads and pastas, oatmeal, brown rice, and bulgur. · Eat a variety of vegetables every day. Include dark, leafy greens such as spinach, kale, jeremi greens, and mustard greens. Eat yellow and orange vegetables such as carrots, sweet potatoes, and winter squash. · Eat a variety of fruits every day. Choose fresh or canned fruit (canned in its own juice or light syrup) instead of juice. Fruit juice has very little or no fiber. · Eat low-fat dairy foods. Drink fat-free (skim) milk or 1% milk. Eat fat-free yogurt and low-fat cottage cheese. Try low-fat cheeses such as mozzarella and other reduced-fat cheeses. · Choose meat and other protein foods that are low in fat. Choose beans or other legumes such as split peas or lentils. Choose fish, skinless poultry (chicken or turkey), or lean cuts of red meat (beef or pork). Before you cook meat or poultry, cut off any visible fat. · Use less fat and oil. Try baking foods instead of frying them. Add less fat, such as margarine, sour cream, regular salad dressing and mayonnaise to foods. Eat fewer high-fat foods. Some examples of high-fat foods include french fries, doughnuts, ice cream, and cakes. · Eat fewer sweets. Limit foods and drinks that are high in sugar. This includes candy, cookies, regular soda, and sweetened drinks. Exercise:  Exercise at least 30 minutes per day on most days of the week. Some examples of exercise include walking, biking, dancing, and swimming. You can also fit in more physical activity by taking the stairs instead of the elevator or parking farther away from stores. Ask your healthcare provider about the best exercise plan for you.       © Copyright [employer] 2018 Information is for End User's use only and may not be sold, redistributed or otherwise used for commercial purposes.  All illustrations and images included in CareNotes® are the copyrighted property of [employer] or [address] [place] [address] Bipolar I disorder, most recent episode mixed, severe with psychotic features   F31.64   no

## 2023-12-05 NOTE — PATIENT PROFILE ADULT - NSPROPOAURINARYCATHETER_GEN_A_NUR
Patient transported to [address] [place] [address] [facility]  12/05/23 [id number]
Provider at bedside.      Rehana Naranjo RN  12/05/23 2060
no